# Patient Record
Sex: FEMALE | Race: WHITE | NOT HISPANIC OR LATINO | Employment: FULL TIME | URBAN - METROPOLITAN AREA
[De-identification: names, ages, dates, MRNs, and addresses within clinical notes are randomized per-mention and may not be internally consistent; named-entity substitution may affect disease eponyms.]

---

## 2022-01-23 ENCOUNTER — HOSPITAL ENCOUNTER (OUTPATIENT)
Dept: MRI IMAGING | Facility: HOSPITAL | Age: 32
Discharge: HOME | End: 2022-01-23
Payer: COMMERCIAL

## 2022-01-23 ENCOUNTER — HOSPITAL ENCOUNTER (EMERGENCY)
Facility: HOSPITAL | Age: 32
Discharge: HOME | End: 2022-01-23
Attending: EMERGENCY MEDICINE
Payer: COMMERCIAL

## 2022-01-23 VITALS
WEIGHT: 171.08 LBS | DIASTOLIC BLOOD PRESSURE: 83 MMHG | RESPIRATION RATE: 18 BRPM | BODY MASS INDEX: 30.31 KG/M2 | HEIGHT: 63 IN | SYSTOLIC BLOOD PRESSURE: 142 MMHG | OXYGEN SATURATION: 98 % | HEART RATE: 71 BPM

## 2022-01-23 DIAGNOSIS — R51.9 NEW ONSET OF HEADACHES: ICD-10-CM

## 2022-01-23 DIAGNOSIS — T50.8X5A ALLERGIC REACTION TO CONTRAST MATERIAL, INITIAL ENCOUNTER: Primary | ICD-10-CM

## 2022-01-23 DIAGNOSIS — R20.0 NUMBNESS ON LEFT SIDE: ICD-10-CM

## 2022-01-23 DIAGNOSIS — R41.3 MEMORY LOSS: ICD-10-CM

## 2022-01-23 PROCEDURE — G1004 CDSM NDSC: HCPCS

## 2022-01-23 PROCEDURE — 99284 EMERGENCY DEPT VISIT MOD MDM: CPT | Performed by: EMERGENCY MEDICINE

## 2022-01-23 PROCEDURE — 99283 EMERGENCY DEPT VISIT LOW MDM: CPT

## 2022-01-23 PROCEDURE — 70553 MRI BRAIN STEM W/O & W/DYE: CPT

## 2022-01-23 PROCEDURE — A9585 GADOBUTROL INJECTION: HCPCS | Performed by: RADIOLOGY

## 2022-01-23 RX ORDER — DIPHENHYDRAMINE HCL 25 MG
50 TABLET ORAL ONCE
Status: COMPLETED | OUTPATIENT
Start: 2022-01-23 | End: 2022-01-23

## 2022-01-23 RX ORDER — PREDNISONE 20 MG/1
20 TABLET ORAL 2 TIMES DAILY
Qty: 6 TABLET | Refills: 0 | Status: SHIPPED | OUTPATIENT
Start: 2022-01-23 | End: 2022-01-26

## 2022-01-23 RX ADMIN — GADOBUTROL 7 ML: 604.72 INJECTION INTRAVENOUS at 16:46

## 2022-01-23 RX ADMIN — PREDNISONE 50 MG: 20 TABLET ORAL at 17:56

## 2022-01-23 RX ADMIN — DIPHENHYDRAMINE HCL 50 MG: 25 TABLET, COATED ORAL at 17:57

## 2022-01-23 NOTE — DISCHARGE INSTRUCTIONS
Allergic Reaction   WHAT YOU NEED TO KNOW:   An allergic reaction is your body's response to an allergen  Allergens include medicines, food, insect stings, animal dander, mold, latex, chemicals, and dust mites  Pollen from trees, grass, and weeds can also cause an allergic reaction  DISCHARGE INSTRUCTIONS:   Return to the emergency department if:   You have a skin rash, hives, swelling, or itching that gets worse  You have trouble breathing, shortness of breath, wheezing, or coughing  Your throat tightens, or your lips or tongue swell  You have trouble swallowing or speaking  You have dizziness, lightheadedness, fainting, or confusion  You have nausea, vomiting, diarrhea, or abdominal cramps  You have chest pain or tightness  Anaphylaxis is a life-threatening allergic reaction that must be treated immediately  Your risk for anaphylaxis increases if you have asthma that is severe or not controlled  Medical conditions such as heart disease can also increase your risk  It is important to be prepared if you are at risk for anaphylaxis  Your symptoms can be worse each time you are exposed to the trigger  DISCHARGE INSTRUCTIONS:   Steps to take for signs or symptoms of anaphylaxis:   Immediately  give 1 shot of epinephrine only into the outer thigh muscle  Leave the shot in place  as directed  Your healthcare provider may recommend you leave it in place for up to 10 seconds before you remove it  This helps make sure all of the epinephrine is delivered  Call 911 and go to the emergency department,  even if the shot improved symptoms  Do not drive yourself  Bring the used epinephrine shot with you  Medicines:   Epinephrine  is used to treat severe allergic reactions such as anaphylaxis  Medicines  such as antihistamines, steroids, and bronchodilators decrease inflammation, open airways, and make breathing easier      Safety precautions:   Keep 2 shots of epinephrine with you at all times  You may need a second shot, because epinephrine only works for about 20 minutes and symptoms may return  Your healthcare provider can show you and family members how to give the shot  Check the expiration date every month and replace it before it expires  Create an action plan  Your healthcare provider can help you create a written plan that explains the allergy and an emergency plan to treat a reaction  The plan explains when to give a second epinephrine shot if symptoms return or do not improve after the first  Give copies of the action plan and emergency instructions to family members, work and school staff, and  providers  Show them how to give a shot of epinephrine  Be careful when you exercise  If you have had exercise-induced anaphylaxis, do not exercise right after you eat  Stop exercising right away if you start to develop any signs or symptoms of anaphylaxis  You may first feel tired, warm, or have itchy skin  Hives, swelling, and severe breathing problems may develop if you continue to exercise  Identify and avoid known triggers  Read food labels for ingredients  Look for triggers in your environment  Ask about treatments to prevent anaphylaxis

## 2022-01-23 NOTE — Clinical Note
Jalil Ozuna was seen and treated in our emergency department on 1/23/2022  Diagnosis: Allergic Reaction    [place]    She may return on this date:     Please excuse from work January 24 2022 for recovery from unexpected allergic reaction that required treatment     If you have any questions or concerns, please don't hesitate to call        Lopez Bustamante MD    ______________________________           _______________          _______________  Hospital Representative                              Date                                Time

## 2022-01-23 NOTE — ED PROVIDER NOTES
History  Chief Complaint   Patient presents with    Allergic Reaction     Patient brought to ED as medical emergency from MRI  Devleoped  itching and rash to chest immediately following MRI with contrast  Patient reports my throat feels funny  Denies difficulty swallowing, breathing  35 yo biological female brought from outpatient MRI for medical emergency  Patient completed MRI with contrast, and began to feel itchy and developed a rash on patient's chest   Patient affirmed a sensation of "funny feeling" in throat, but not as severe as what patient has experienced with bee sting  Denies dyspnea or chest tightness  No medication was given PTA  History provided by:  Patient      None       History reviewed  No pertinent past medical history  Past Surgical History:   Procedure Laterality Date    TONSILLECTOMY         History reviewed  No pertinent family history  I have reviewed and agree with the history as documented  E-Cigarette/Vaping     E-Cigarette/Vaping Substances     Social History     Tobacco Use    Smoking status: Never Smoker    Smokeless tobacco: Current User   Substance Use Topics    Alcohol use: Not Currently    Drug use: Yes     Types: Marijuana       Review of Systems   Constitutional: Negative for appetite change, chills and fever  HENT: Negative for sore throat  Respiratory: Negative for cough, shortness of breath and wheezing  Cardiovascular: Negative for chest pain and palpitations  Gastrointestinal: Negative for abdominal pain, diarrhea, nausea and vomiting  Genitourinary: Negative for dysuria and hematuria  Musculoskeletal: Negative for neck pain  Skin: Positive for rash  Neurological: Negative for dizziness, weakness and headaches  Psychiatric/Behavioral: Negative for suicidal ideas  All other systems reviewed and are negative  Physical Exam  Physical Exam  Vitals and nursing note reviewed     Constitutional:       General: She is not in acute distress  Appearance: She is well-developed  She is not diaphoretic  HENT:      Head: Normocephalic and atraumatic  Right Ear: External ear normal       Left Ear: External ear normal       Nose: Nose normal    Eyes:      Conjunctiva/sclera: Conjunctivae normal       Pupils: Pupils are equal, round, and reactive to light  Cardiovascular:      Rate and Rhythm: Normal rate and regular rhythm  Pulmonary:      Effort: Pulmonary effort is normal    Abdominal:      Palpations: Abdomen is soft  Musculoskeletal:         General: Normal range of motion  Cervical back: Normal range of motion and neck supple  Skin:     General: Skin is warm and dry  Capillary Refill: Capillary refill takes less than 2 seconds  Findings: Rash (lacy appearance on chest and neck) present  Neurological:      Mental Status: She is alert and oriented to person, place, and time  Gait: Gait normal    Psychiatric:         Behavior: Behavior normal          Thought Content: Thought content normal          Judgment: Judgment normal          Vital Signs  ED Triage Vitals [01/23/22 1720]   Temp Pulse Respirations Blood Pressure SpO2   -- 71 18 142/83 98 %      Temp Source Heart Rate Source Patient Position - Orthostatic VS BP Location FiO2 (%)   Oral Monitor Sitting Left arm --      Pain Score       No Pain           Vitals:    01/23/22 1720   BP: 142/83   Pulse: 71   Patient Position - Orthostatic VS: Sitting         Visual Acuity      ED Medications  Medications   diphenhydrAMINE (BENADRYL) tablet 50 mg (50 mg Oral Given 1/23/22 1757)   predniSONE tablet 50 mg (50 mg Oral Given 1/23/22 1756)       Diagnostic Studies  Results Reviewed     None                 No orders to display              Procedures  Procedures         ED Course  ED Course as of 01/23/22 1815   Sun Jan 23, 2022   1758 She is feeling better, has been given medication    I was recommending a period of observation in the ED, but she can be discharged with return precautions  SBIRT 20yo+      Most Recent Value   SBIRT (24 yo +)    In order to provide better care to our patients, we are screening all of our patients for alcohol and drug use  Would it be okay to ask you these screening questions? No Filed at: 01/23/2022 1800                    MDM    Disposition  Final diagnoses: Allergic reaction to contrast material, initial encounter - MRI contrast     Time reflects when diagnosis was documented in both MDM as applicable and the Disposition within this note     Time User Action Codes Description Comment    1/23/2022  6:00 PM Juan M Melgoza Add [T50 8X5A] Allergic reaction to contrast material, initial encounter     1/23/2022  6:00 PM Juan M Melgoza Modify [T50 8X5A] Allergic reaction to contrast material, initial encounter MRI contrast      ED Disposition     ED Disposition Condition Date/Time Comment    Discharge Good Sun Jan 23, 2022  6:00 PM [address] discharge to home/self care  Follow-up Information     Follow up With Specialties Details Why Contact Info    Marlen Helm [facility] Medicine Go to  As needed [facility] [address] [phone]            Discharge Medication List as of 1/23/2022  6:01 PM      START taking these medications    Details   predniSONE 20 mg tablet Take 1 tablet (20 mg total) by mouth 2 (two) times a day for 3 days, Starting Sun 1/23/2022, Until Wed 1/26/2022, Normal             No discharge procedures on file      PDMP Review     None          ED Provider  Electronically Signed by           Henry Delaney MD  01/23/22 1818

## 2022-01-23 NOTE — Clinical Note
Cathie Asif was seen and treated in our emergency department on 1/23/2022  Diagnosis: Allergic Reaction    [place]    She may return on this date:     Please excuse from work tonight for recovery from unexpected allergic reaction that required treatment     If you have any questions or concerns, please don't hesitate to call        Jason Dean MD    ______________________________           _______________          _______________  Hospital Representative                              Date                                Time

## 2022-01-23 NOTE — Clinical Note
Vaibhav Nava was seen and treated in our emergency department on 1/23/2022  Diagnosis:     [place]    She may return on this date: If you have any questions or concerns, please don't hesitate to call        Rossy Nunes MD    ______________________________           _______________          _______________  Hospital Representative                              Date                                Time

## 2022-05-23 ENCOUNTER — APPOINTMENT (EMERGENCY)
Dept: CT IMAGING | Facility: HOSPITAL | Age: 32
End: 2022-05-23
Payer: COMMERCIAL

## 2022-05-23 ENCOUNTER — HOSPITAL ENCOUNTER (EMERGENCY)
Facility: HOSPITAL | Age: 32
End: 2022-05-26
Attending: EMERGENCY MEDICINE | Admitting: EMERGENCY MEDICINE
Payer: COMMERCIAL

## 2022-05-23 DIAGNOSIS — R46.2 BIZARRE BEHAVIOR: Primary | ICD-10-CM

## 2022-05-23 DIAGNOSIS — F84.0 AUTISM: ICD-10-CM

## 2022-05-23 LAB
AMPHETAMINES SERPL QL SCN: NEGATIVE
BARBITURATES UR QL: NEGATIVE
BENZODIAZ UR QL: NEGATIVE
COCAINE UR QL: NEGATIVE
ETHANOL EXG-MCNC: 0 MG/DL
EXT PREG TEST URINE: NEGATIVE
EXT. CONTROL ED NAV: NORMAL
FLUAV RNA RESP QL NAA+PROBE: NEGATIVE
FLUBV RNA RESP QL NAA+PROBE: NEGATIVE
METHADONE UR QL: NEGATIVE
OPIATES UR QL SCN: NEGATIVE
OXYCODONE+OXYMORPHONE UR QL SCN: NEGATIVE
PCP UR QL: NEGATIVE
RSV RNA RESP QL NAA+PROBE: NEGATIVE
SARS-COV-2 RNA RESP QL NAA+PROBE: NEGATIVE
THC UR QL: POSITIVE

## 2022-05-23 PROCEDURE — 70450 CT HEAD/BRAIN W/O DYE: CPT

## 2022-05-23 PROCEDURE — 99285 EMERGENCY DEPT VISIT HI MDM: CPT

## 2022-05-23 PROCEDURE — 80307 DRUG TEST PRSMV CHEM ANLYZR: CPT | Performed by: EMERGENCY MEDICINE

## 2022-05-23 PROCEDURE — 81025 URINE PREGNANCY TEST: CPT | Performed by: EMERGENCY MEDICINE

## 2022-05-23 PROCEDURE — 0241U HB NFCT DS VIR RESP RNA 4 TRGT: CPT | Performed by: EMERGENCY MEDICINE

## 2022-05-23 PROCEDURE — 82075 ASSAY OF BREATH ETHANOL: CPT | Performed by: EMERGENCY MEDICINE

## 2022-05-23 PROCEDURE — G1004 CDSM NDSC: HCPCS

## 2022-05-23 PROCEDURE — 99284 EMERGENCY DEPT VISIT MOD MDM: CPT | Performed by: EMERGENCY MEDICINE

## 2022-05-23 RX ORDER — IBUPROFEN 600 MG/1
600 TABLET ORAL ONCE
Status: COMPLETED | OUTPATIENT
Start: 2022-05-23 | End: 2022-05-23

## 2022-05-23 RX ORDER — ESCITALOPRAM OXALATE 10 MG/1
10 TABLET ORAL
COMMUNITY
Start: 2022-03-31 | End: 2022-06-06

## 2022-05-23 RX ORDER — DEXTROAMPHETAMINE SACCHARATE, AMPHETAMINE ASPARTATE, DEXTROAMPHETAMINE SULFATE AND AMPHETAMINE SULFATE 5; 5; 5; 5 MG/1; MG/1; MG/1; MG/1
20 TABLET ORAL
COMMUNITY
Start: 2022-03-16 | End: 2022-06-06

## 2022-05-23 RX ORDER — DEXTROAMPHETAMINE SACCHARATE, AMPHETAMINE ASPARTATE, DEXTROAMPHETAMINE SULFATE AND AMPHETAMINE SULFATE 1.25; 1.25; 1.25; 1.25 MG/1; MG/1; MG/1; MG/1
5 TABLET ORAL
COMMUNITY
Start: 2022-03-31 | End: 2022-06-06

## 2022-05-23 RX ORDER — LORAZEPAM 1 MG/1
2 TABLET ORAL ONCE
Status: COMPLETED | OUTPATIENT
Start: 2022-05-23 | End: 2022-05-23

## 2022-05-23 RX ADMIN — LORAZEPAM 2 MG: 1 TABLET ORAL at 23:04

## 2022-05-23 RX ADMIN — IBUPROFEN 600 MG: 600 TABLET ORAL at 19:42

## 2022-05-23 NOTE — ED NOTES
Family reports that patient was taking Welbutrin 300mg that the patient stopped under advisement of her PCP, LUIS Foley with [facility]  Mother (not paternal but has been caring for patient since patient is 15) reports that patient ceased sessions with their psychologist in January and seems to be declining since that time  Patient has hx of cutting with most recent episode around mother's day  Patient cut their upper leg to be able to feel the ends of their fingers enough to remove their contacts so pt cuts to feel  Mother reports patient would not let her leave the residence yesterday because their was a nuclear war and mother had to wear a mask to avoid exposure  Mother states they are focusing on current events like the war in [place] and projecting them to today and here        Katelin Tolentino RN  05/23/22 2868

## 2022-05-23 NOTE — LETTER
[facility]  [address] [address]  Dept: [phone]                                                                 EMTALA TRANSFER CONSENT    NAME Deepak Garland                             1990                              MRN [id number]    I have been informed of my rights regarding examination, treatment, and transfer   by Dr Osvaldo Berman MD    Benefits: Other benefits (Include comment)_______________________    Risks: Potential for delay in receiving treatment    Consent for Transfer:  I acknowledge that my medical condition has been evaluated and explained to me by the emergency department physician or other qualified medical person and/or my attending physician, who has recommended that I be transferred to the service of  Accepting Physician: Dr Jose Gardner at [address] [facility] Name, [facility] & State : [place] [address]  [place], [place]  The above potential benefits of such transfer, the potential risks associated with such transfer, and the probable risks of not being transferred have been explained to me, and I fully understand them  The doctor has explained that, in my case, the benefits of transfer outweigh the risks  I agree to be transferred  I authorize the performance of emergency medical procedures and treatments upon me in both transit and upon arrival at the receiving facility  Additionally, I authorize the release of any and all medical records to the receiving facility and request they be transported with me, if possible  I understand that the safest mode of transportation during a medical emergency is an ambulance and that the Hospital advocates the use of this mode of transport  Risks of traveling to the receiving facility by car, including absence of medical control, life sustaining equipment, such as oxygen, and medical personnel has been explained to me and I fully understand them      ([place] BELOW)  [X]  I consent to the stated transfer and to be transported by ambulance/helicopter  [  ]  I consent to the stated transfer, but refuse transportation by ambulance and accept full responsibility for my transportation by car  I understand the risks of non-ambulance transfers and I exonerate the Hospital and its staff from any deterioration in my condition that results from this refusal     X___________________________________________    DATE  22  TIME________  Signature of patient or legally responsible individual signing on patient behalf           RELATIONSHIP TO PATIENT_________________________                          Provider Certification    NAME Kevin Garland                             1990                              MRN [id number]    A medical screening exam was performed on the above named patient  Based on the examination:    Condition Necessitating Transfer The primary encounter diagnosis was Bizarre behavior  A diagnosis of Autism was also pertinent to this visit      Patient Condition: The patient has been stabilized such that within reasonable medical probability, no material deterioration of the patient condition or the condition of the unborn child(darrick) is likely to result from the transfer    Reason for Transfer: Level of Care needed not available at this facility    Transfer Requirements: [address] [place], [place]   · Space available and qualified personnel available for treatment as acknowledged by REYES Gayle  · Agreed to accept transfer and to provide appropriate medical treatment as acknowledged by       Dr Ekta Mckeon  · Appropriate medical records of the examination and treatment of the patient are provided at the time of transfer   [address] [facility] __JG_____  · Transfer will be performed by qualified personnel from _______________________  and appropriate transfer equipment as required, including the use of necessary and appropriate life support measures  Provider Certification: I have examined the patient and explained the following risks and benefits of being transferred/refusing transfer to the patient/family:  The patient is stable for psychiatric transfer because they are medically stable, and is protected from harming him/herself or others during transport      Based on these reasonable risks and benefits to the patient and/or the unborn child(darrick), and based upon the information available at the time of the patients examination, I certify that the medical benefits reasonably to be expected from the provision of appropriate medical treatments at another medical facility outweigh the increasing risks, if any, to the individuals medical condition, and in the case of labor to the unborn child, from effecting the transfer      X____________________________________________ DATE 05/26/22        TIME_______      ORIGINAL - SEND TO MEDICAL RECORDS   COPY - SEND WITH PATIENT DURING TRANSFER

## 2022-05-23 NOTE — ED PROVIDER NOTES
History  Chief Complaint   Patient presents with    Psychiatric Evaluation     Pt arrives with friend who is able to answer triage questions, per friend patient is acting abnormal with delusions, periods of catatonia, marching steps  Pt refusing to answer during these periods      82-year-old female brought in by family friend for evaluation of bizarre behavior  Patient has normal autism is been becoming more and more bizarre and unable to care for self  Emotionally labile  Evidence of self-mutilation  History provided by:  Friend  History limited by:  Psychiatric disorder  Psychiatric Evaluation      None       Past Medical History:   Diagnosis Date    Autism spectrum        Past Surgical History:   Procedure Laterality Date    TONSILLECTOMY         History reviewed  No pertinent family history  I have reviewed and agree with the history as documented  E-Cigarette/Vaping     E-Cigarette/Vaping Substances     Social History     Tobacco Use    Smoking status: Never Smoker    Smokeless tobacco: Current User   Substance Use Topics    Alcohol use: Not Currently    Drug use: Yes     Types: Marijuana       Review of Systems   Unable to perform ROS: Psychiatric disorder       Physical Exam  Physical Exam  Constitutional:       General: She is not in acute distress  Appearance: She is well-developed  She is not toxic-appearing  HENT:      Head: Normocephalic and atraumatic  Right Ear: External ear normal       Left Ear: External ear normal    Eyes:      Extraocular Movements: Extraocular movements intact  Neck:      Vascular: No JVD  Trachea: No tracheal deviation  Cardiovascular:      Rate and Rhythm: Normal rate and regular rhythm  Pulmonary:      Effort: Pulmonary effort is normal  No tachypnea, accessory muscle usage or respiratory distress  Abdominal:      General: There is no distension  Musculoskeletal:         General: No deformity        Cervical back: Normal range of motion  No rigidity  Right lower leg: Normal  No edema  Left lower leg: Normal  No edema  Skin:     General: Skin is warm  Capillary Refill: Capillary refill takes less than 2 seconds  Neurological:      Mental Status: She is alert  Mental status is at baseline  Comments: Moves all 4 extremities, amublating     Psychiatric:         Mood and Affect: Affect is labile  Speech: She is noncommunicative  Behavior: Behavior is uncooperative and withdrawn  Vital Signs  ED Triage Vitals   Temperature Pulse Respirations Blood Pressure SpO2   05/23/22 1707 05/23/22 1706 05/23/22 1706 05/23/22 1706 05/23/22 1706   98 9 °F (37 2 °C) (!) 113 20 (!) 176/113 99 %      Temp Source Heart Rate Source Patient Position - Orthostatic VS BP Location FiO2 (%)   05/23/22 1707 05/23/22 1706 05/23/22 1706 05/23/22 1706 --   Oral Monitor Sitting Right arm       Pain Score       --                  Vitals:    05/23/22 1706   BP: (!) 176/113   Pulse: (!) 113   Patient Position - Orthostatic VS: Sitting         Visual Acuity      ED Medications  Medications - No data to display    Diagnostic Studies  Results Reviewed     Procedure Component Value Units Date/Time    Rapid drug screen, urine [[id number]] Collected: 05/23/22 1803    Lab Status: In process Specimen: Urine, Other Updated: 05/23/22 1821    COVID/FLU/RSV - 2 hour TAT [[id number]] Collected: 05/23/22 1807    Lab Status:  In process Specimen: Nares from Nasopharyngeal Swab Updated: 05/23/22 1810    POCT pregnancy, urine [[id number]]  (Normal) Resulted: 05/23/22 1807    Lab Status: Final result Updated: 05/23/22 1807     EXT PREG TEST UR (Ref: Negative) negative     Control valid    POCT alcohol breath test [[id number]]  (Normal) Resulted: 05/23/22 1807    Lab Status: Final result Updated: 05/23/22 1807     EXTBreath Alcohol 0 00                 No orders to display              Procedures  Procedures         ED Course MDM  Number of Diagnoses or Management Options  Diagnosis management comments: Bizarre behavior-will consult crisis      Disposition  Final diagnoses:   Bizarre behavior   Autism     Time reflects when diagnosis was documented in both MDM as applicable and the Disposition within this note     Time User Action Codes Description Comment    5/23/2022  6:22 PM Cindy Gómez Add [R46 2] Bizarre behavior     5/23/2022  6:22 PM Cindy Gómez Add [F84 0] Autism       ED Disposition     ED Disposition   Transfer to [address] [place] [address]   --    Date/Time   Mon May 23, 2022  6:22 PM    Comment   Edy Hays Meder should be transferred out to  and has been medically cleared  Follow-up Information    None         Patient's Medications    No medications on file       No discharge procedures on file      PDMP Review     None          ED Provider  Electronically Signed by           Robby Melendez MD  05/23/22 7420

## 2022-05-23 NOTE — ED NOTES
Pt observed in room with visitors at bedside  Pt can be noted to be stomping loudly on ground  Pt also noted to be sitting on visitors lap and cuddling like a small child  Pt also dancing around room and throwing themselves upon the stretcher  Will continue to monitor        Truddie Rides  83/54/65 [id number]

## 2022-05-23 NOTE — ED NOTES
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  It was during this employment that the patient was also diagnosed with autism  Along with the autism diagnosis the patient is extremely photosensitive and has sound sensitivities as well  The patient had a fainting spell at the school prior to ending her employment there  The patient has a history of self harm via cutting on her arms and upper things, and last self harmed Mother's Day weekend  The patient has been perseverating on a specific date and time, during which they feel something bad is going to happen to everyone  At this time the patient believes themselves to be an brandi that came to earth to save humanity, but had her wings cut off and is being killed by the humans they came to save  Patient has been increasingly paranoid, feeling as if there is going to be a nuclear holocaust, prompting them to refuse to let their mother leave the house  Patient has reportedly eloped from the home and run into the woods during one bout of extreme paranoia  According to the mother the patient has not been sleeping or eating well, and has lost much more than 20 pounds over the past few months  Patient is extremely tangential, and became agitated when asked if they would sign themselves in for treatment  Patient had been to [address] when they were younger   When Stephanie came up in conversation the patient began to scream that they would not return there and their roommate, who was also an brandi,  while receiving treatment there  Patient's mother is willing to petition a 36 at this time      Pablo Mortensen  Crisis Worker, [place]  22

## 2022-05-23 NOTE — ED NOTES
Assumed pt care at this time  Pt alert and awake, unlabored breathing  Not answering questions  2 visitors at bedside        Arin Hernandez RN  05/23/22 7040

## 2022-05-23 NOTE — LETTER
Section I - General Information    Name of Patient: Anjana Garland                 : 1990    Medicare #:____________________  Transport Date: 22 (PCS is valid for round trips on this date and for all repetitive trips in the 60-day range as noted below )  Origin: [place] [address]                                                         Destination:________________________________________________  Is the pt's stay covered under Medicare Part A (PPS/DRG)     (_) YES  (_) NO  Closest appropriate facility?  (_) YES  (_) NO  If no, why is transport to more distant facility required?________________________  If hosp-hosp transfer, describe services needed at 2nd facility not available at 1st facility? _________________________________  If hospice pt, is this transport related to pt's terminal illness? (_) YES (_) NO Describe____________________________________    Section II - Medical Necessity Questionnaire  Ambulance transportation is medically necessary only if other means of transport are contraindicated or would be potentially harmful to the patient  To meet this requirement, the patient must either be "bed confined" or suffer from a condition such that transport by means other than ambulance is contraindicated by the patient's condition   The following questions must be answered by the medical professional signing below for this form to be valid:    1)  Describe the MEDICAL CONDITION (physical and/or mental) of this patient AT [address] [place] [address] that requires the patient to be transported in an ambulance and why transport by other means is contraindicated by the patient's condition:__________________________________________________________________________________________________    2) Is the patient "bed confined" as defined below?     (_) YES  (_) NO  To be "be confined" the patient must satisfy all three of the following conditions: (1) unable to get up from bed without Assistance; AND (2) unable to ambulate; AND (3) unable to sit in a chair or wheelchair  3) Can this patient safely be transported by car or wheelchair Emerald F[facility] (i e , seated during transport without a medical attendant or monitoring)?   (_) YES  (_) NO    4) In addition to completing questions 1-3 above, please check any of the following conditions that apply*:  *Note: supporting documentation for any boxes checked must be maintained in the patient's medical records  (_)Contractures   (_)Non-Healed Fractures  (_)Patient is confused (_)Patient is comatose (_)Moderate/severe pain on movement (_)Danger to self/others  (_)IV meds/fluids required (_)Patient is combative(_)Need or possible need for restraints (_)DVT requires elevation of lower extremity  (_)Medical attendant required (_)Requires oxygen-unable to self administer (_)Special handling/isolation/infection control precautions required (_)Unable to tolerate seated position for time needed to transport (_)Hemodynamic monitoring required en route (_)Unable to sit in a chair or wheelchair due to decubitus ulcers or other wounds (_)Cardiac monitoring required en route (_)Morbid obesity requires additional personnel/equipment to safely handle patient (_)Orthopedic device (backboard, halo, pins, traction, brace, wedge, etc,) requiring special handling during transport (_)Other(specify)_______________________________________________    Section III - Signature of Physician or Healthcare Professional    I certify that the above information is accurate based on my evaluation of this patient, and that the medical necessity provisions of 42  40(e)(1) are met, requiring that this patient be transported by ambulance  I understand this information will be used by the Centers for Medicare and Medicaid Services (CMS) to support the determination of medical necessity for ambulance services   I represent that I am the beneficiarys attending physician; or an employee of the beneficiarys attending physician, or the hospital or facility where the beneficiary is being treated and from which the beneficiary is being transported; that I have personal knowledge of the beneficiarys condition at the time of transport; and that I meet all Medicare regulations and applicable State licensure laws for the credential indicated  (_) If this box is checked, I also certify that the patient is physically or mentally incapable of signing the ambulance service's claim and that the institution with which I am affiliated has furnished care, services, or assistance to the patient  My signature below is made on behalf of the patient pursuant to 42 CFR §424 36(b)(4)  In accordance with 42 CFR §424 37, the specific reason(s) that the patient is physically or mentally incapable of signing the claim form is as follows: _________________________________________________________________________________________________________      Signature of Physician* or Healthcare Professional______________________________________________________________  Signature Date 05/26/22 (For scheduled repetitive transports, this form is not valid for transports performed more than 60 days after this date)    Printed Name & Credentials of Physician or Healthcare Professional (MD, DO, RN, etc )________________________________  *Form must be signed by patient's attending physician for scheduled, repetitive transports   For non-repetitive, unscheduled ambulance transports, if unable to obtain the signature of the attending physician, any of the following may sign (choose appropriate option below)  (_) Physician Assistant   (_)  Clinical Nurse Specialist    (_)  Licensed Practical Nurse    (_)    (_)  Nurse Practitioner     (_)  Registered Nurse                (_)                         (_) Discharge Planner

## 2022-05-23 NOTE — ED NOTES
Patient is light sensitive, complains if any overhead lighting is too bright  At this time, cowered in the corner of there room, mewling  Care givers at bedside        Catherine Contreras RN  05/23/22 7352

## 2022-05-24 PROCEDURE — 99203 OFFICE O/P NEW LOW 30 MIN: CPT | Performed by: PSYCHIATRY & NEUROLOGY

## 2022-05-24 RX ORDER — OLANZAPINE 5 MG/1
5 TABLET, ORALLY DISINTEGRATING ORAL DAILY
Status: DISCONTINUED | OUTPATIENT
Start: 2022-05-25 | End: 2022-05-26 | Stop reason: HOSPADM

## 2022-05-24 RX ORDER — OLANZAPINE 10 MG/1
5 INJECTION, POWDER, LYOPHILIZED, FOR SOLUTION INTRAMUSCULAR EVERY 6 HOURS PRN
Status: DISCONTINUED | OUTPATIENT
Start: 2022-05-24 | End: 2022-05-26 | Stop reason: HOSPADM

## 2022-05-24 NOTE — ED NOTES
Pt wandering hallway after using bathroom, RN reminded pt she must return to her room due to the privacy of other pts  Pt provided with more menthol cough drops        Cheri Ellis RN  05/24/22 1943

## 2022-05-24 NOTE — ED NOTES
Meal tray ordered at this time  Pt is asking RN for pencil to write her thoughts, RN explained to pt that she is to use the markers provided as she is unable to have a pencil due to safety precautions  Pt states that she is unable to write how she needs to with the provided markers  RN pointed out at this time that pt has many items in her room at this moment that are not allowable due to safety purposes and that giving a pencil at this time is not an option        Sulaiman Arriaga RN  05/24/22 8750

## 2022-05-24 NOTE — ED NOTES
Patient continues to rest on stretcher, respirations even and unlabored   Will defer VS and reassesment     Destin North RN  05/24/22 2329

## 2022-05-24 NOTE — ED NOTES
Pt came out of bathroom with concerns of air quality stating, 'I'm sick of breathing in the toxins, it's making me lose it, I need mint and water'  RN assured pt that HEPA filters are present in the hospitals filtration system  Pt responded, 'I can see you don't get it and won't do anything to help me'  RN provided pt with menthol cough drops and ice water at this time in which they were grateful for  RN informed pt she is doing the best she can to accommodate pt but they communicate with RN about what they need at this time        Lima Zaldivar RN  05/24/22 2972

## 2022-05-24 NOTE — ED NOTES
Precert:    [facility] has no availability in beds today    Fax referral to Monisha Ricardo)    [facility]  Patient is refusing     [facility] - no acute beds - will call if changes   [facility] - no beds  [facility]) no beds  [facility]) no beds  [facility] - [facility] beds  [address] (Cathie Wiley) no beds  [facility]) no beds  [facility]) no beds  [facility] - no ans

## 2022-05-24 NOTE — ED NOTES
Insurance   EVS (Eligibility Verification System) called - [phone]    Automated system indicates: insurance is active with [employer] ID # [id number]  (under the name [facility])

## 2022-05-24 NOTE — ED NOTES
Offered patient breakfast at this time  Pt denies wanting any at this time  Pt stating "it is terrible to be locked up in here when I need to be out in nature   Requesting to go outside to use their vape " Pt told they cannot do so while at the hospital      Gianna Hernandez, JERO  05/24/22 4800

## 2022-05-24 NOTE — ED NOTES
Pt walked to nurses station, crying  This RN asked what we could do for them and they requested a pad and new pair of scrubs  RN offered shower at this time and pt declined  Pt still tearful, using bathroom at this time        Miah Madrid, JERO  05/24/22 0577

## 2022-05-24 NOTE — ED NOTES
RN assumed care of pt at this time  Pt is in bed with blanket and belongings from home; per previous RN, charge nurse and security are aware of pt in possession of belongings as they help 'drown out the voices'  No distress noted at this time; equal and even chest rises noted        Destiny Rivera RN  05/24/22 7807

## 2022-05-24 NOTE — ED NOTES
Pt's mother and brother about to leave pt's bedside when pt starts getting upset, banging head against wall  RN and crisis worker at bedside  Pt laying on floor staring at ceiling  Provider called to bedside  Pt staring at ceiling, not answering provider's questions, but looking around at RN and provider  Pt sits up, alert, and gets into stretcher  Vitals taken at bedside  Pt starts rambling "This is not how you treat me, I don't have a mental health problem  Everyone has a mental health problem, but they aren't listening to me " Pt continues to ramble about saving the world, people living under the ocean with nobody knowing, stating "stop using plastic"  Pt tearful  RN tries to redirect pt and calm pt  Pt continues to ramble  Pt starts to calm, pulling blanket over head and crying  RN continues to talk to pt in calm manner  Pt responding in calm manner, but rambling       Taz Lau RN  05/23/22 4489 Private car

## 2022-05-24 NOTE — ED NOTES
Clinical sent to [facility] for review       Allison Downing, CORNLEIO  05/24/22  0262

## 2022-05-24 NOTE — ED NOTES
Assumed care of patient at this time  Patient pacing around room, stomping feet, continuing to scream "Aditya Wiergate"  Patient appears to be responding to internal stimuli  Provider aware  Orders placed  Patient took medication without incident, given new pair of [facility] scrubs       Gera Lyn RN  05/23/22 9473

## 2022-05-24 NOTE — ED NOTES
Asked patient again if they would like anything to eat and pt replies "why do you keep asking me things that I don't have an answer to  I don't want to talk to you because you can't give me what I want " When asked what she wants pt became tearful and stated "to not be an brandi locked in this cell " Asked pt if this RN could call mother which she agreed to at this time  Voicemail left for mother at this time        Blayne Cunningham RN  05/24/22 0055

## 2022-05-24 NOTE — ED NOTES
Pt lying in bed in more calm state, still tearful  Mother and brother left bedside       Arin Hernandez RN  05/23/22 5492

## 2022-05-24 NOTE — ED NOTES
Patient does NOT want [facility] to be considered during a bed search      Christine Barragan  Crisis Worker, [place]  05/23/22

## 2022-05-24 NOTE — ED NOTES
RN is on hold with [facility] at this time to request psych consult for pt        Merry Boast, JERO  05/24/22 0213

## 2022-05-24 NOTE — ED NOTES
Assumed pt care at this time  Pt sleeping at this time  4 checks per hour being completed on paper charting        Ciarra Levin RN  05/24/22 7105

## 2022-05-24 NOTE — ED NOTES
Spoke with Nurse and patient's mother  Patient provided with own art supplies and paper to create while here in the Emergency Department       Jannette Cheung  Crisis Worker, [place]  05/23/22

## 2022-05-24 NOTE — ED NOTES
Call placed to [address], spoke with Ismael Davenport, who indicated that they have no beds at this time       Anastasiia Viramontes, CORNELIO  05/24/22  2777

## 2022-05-24 NOTE — ED NOTES
RN contacted Dr Adenike Saini for psych consult; RN was directed to call [facility] at this time for overnight consult         Merry Boast, RN  05/24/22 3924

## 2022-05-24 NOTE — ED NOTES
Pt moaning and scratching back, RN went to pt bedside to evaluate pt and ask them what is bothering them  Pt threw themselves onto the floor and laid there without verbalizing needs to RN  Pt not harmed and no rash noted on pt at this time         Allen Gregory RN  05/24/22 3201

## 2022-05-24 NOTE — ED NOTES
Object sticking out of iPad is a stylus, previous RNs allowed pt to have object  RN informed pt that if her safety becomes a concern the stylet will be removed from the room        Federica Byrd RN  05/24/22 8682

## 2022-05-24 NOTE — ED NOTES
Upheld 302 faxed to  Intake for review for in network bed      Pepito Benedict  Crisis Worker, [place]  05/23/22

## 2022-05-24 NOTE — ED NOTES
Upheld 302 faxed to [facility] ([facility] AT [facility]  Crisis Worker, [facility]  05/23/22

## 2022-05-24 NOTE — ED NOTES
Pt having menstruation and stating "can you call my mom and tell her that I am not suppose to be having this, especially this much " Pt asked if they were in pain at this time and they report they are "always in pain and this has been getting worse because they are not in the sunlight "     Blayne Cunningham, JERO  05/24/22 4883

## 2022-05-24 NOTE — ED NOTES
Pt wearing headphones and eyewear in room while pacing back and forth with iPad, object noted sticking up from iPad, RN will investigate object        Sheldon Knight RN  05/24/22 9019

## 2022-05-24 NOTE — ED NOTES
RN called bita to follow up with estimated time for consult, per bita, RN will be contacted with ETA when available       Mumtaz Dos Santos RN  05/24/22 6806

## 2022-05-25 PROCEDURE — 99213 OFFICE O/P EST LOW 20 MIN: CPT | Performed by: PSYCHIATRY & NEUROLOGY

## 2022-05-25 RX ORDER — ESCITALOPRAM OXALATE 10 MG/1
10 TABLET ORAL DAILY
Status: DISCONTINUED | OUTPATIENT
Start: 2022-05-25 | End: 2022-05-26 | Stop reason: HOSPADM

## 2022-05-25 RX ORDER — NICOTINE 21 MG/24HR
21 PATCH, TRANSDERMAL 24 HOURS TRANSDERMAL ONCE
Status: DISCONTINUED | OUTPATIENT
Start: 2022-05-25 | End: 2022-05-26 | Stop reason: HOSPADM

## 2022-05-25 RX ADMIN — Medication 21 MG: at 21:43

## 2022-05-25 RX ADMIN — OLANZAPINE 5 MG: 5 TABLET, ORALLY DISINTEGRATING ORAL at 09:13

## 2022-05-25 RX ADMIN — ESCITALOPRAM OXALATE 10 MG: 10 TABLET ORAL at 15:17

## 2022-05-25 NOTE — ED NOTES
Completed 302/303 Paperwork faxed to [employer] at [facility]      Ilene Gee  Crisis Worker, [place]  05/25/22

## 2022-05-25 NOTE — ED NOTES
Pt completed psych consult; now pt is fixated on wanting to see her mother and be outdoors  RN informed pt that she will move them into another room once it becomes available so she can see the outside        Destiny Rivera RN  05/24/22 2030

## 2022-05-25 NOTE — ED NOTES
Pts mother called and said she is going to come visit and bring pts contacts        Mely Day, [address] [facility]  05/25/22 5534
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
## 2022-05-25 NOTE — ED NOTES
Assumed care of patient at this time  Patient alert and cooperative at this time  Will continue to monitor        Jac Rome RN  05/24/22 1797

## 2022-05-25 NOTE — ED NOTES
Bed Search    [facility] Miles): clinical faxed  [facility]): no bed  [facility] ([facility]): Clinical faxed  [facility] Carrasco):  No beds  [facility]ñoz): No beds  [facility]): clinical faxed  [facility] Re[facility]): No bed  [facility] Galloway): no beds  [facility]  [facility]): No bed

## 2022-05-25 NOTE — ED NOTES
Pt was ordered gluten free breakfast tray and consumed 100% of breakfast  Pt currently listening to music and dancing in room   Pt is cooperative at this time        Charles Marsh RN  05/25/22 1374

## 2022-05-25 NOTE — ED NOTES
Pt provided with turkey sandwich and more cough drops at this time per pt request      Fuentes Rutledge, JERO  05/24/22 1068

## 2022-05-25 NOTE — ED NOTES
Bed Search:    Esperanza Herrera- No beds available  Raul Kelsey- No appropriate beds  Biju Nelson- Will review- FAXED  Isra 81- No beds available  112 Naun- No beds available  [facility]- Will Review for D/C bed 5 25 22- FAXED  Ermelinda Tineo- Will Review for D/C bed 5 25 22- FAXED  Azeb- Pt   Still under review    Ilene Garcias  Crisis Worker, [place]  05/24/22

## 2022-05-25 NOTE — ED NOTES
Follow up on previous Bed Search:    [facility]ia Remy Farias- Does not have an appropriate bed available at this time  Josey 41 due to acuity    Zoltan Peters  Crisis Worker, [facility]  05/25/22

## 2022-05-25 NOTE — ED CARE HANDOFF
Emergency Department Sign Out Note        Sign out and transfer of care from [facility]  See Separate Emergency Department note  The patient, Jayda Garland, was evaluated by the previous provider for psychosis  Workup Completed:  Crisis, psychiatry  302  ED Course / Workup Pending (followup): Was told by the nurse that the patient forcefully struck her head into the floor  She is awake and alert with no marks on her head eating lunch in asked me for water  She is still under 302  Psychiatry will be seeing her today for 303 hearing  There is no bed placement as of yet  Patient is currently menstruating  She was given a pad  Procedures  MDM        Disposition  Final diagnoses:   Bizarre behavior   Autism     Time reflects when diagnosis was documented in both MDM as applicable and the Disposition within this note     Time User Action Codes Description Comment    5/23/2022  6:22 PM Melrose Hem Add [R46 2] Bizarre behavior     5/23/2022  6:22 PM Sara Hem Add [F84 0] Autism       ED Disposition     ED Disposition   Transfer to [address] [place] [address]   --    Date/Time   Mon May 23, 2022  6:22 PM    Comment   Jayda Garland should be transferred out to  and has been medically cleared  Follow-up Information    None       Patient's Medications   Discharge Prescriptions    No medications on file     No discharge procedures on file         ED Provider  Electronically Signed by     Ashley Sawyer MD  05/25/22 119 Ashanti Oviedo MD  05/25/22 2555

## 2022-05-25 NOTE — ED NOTES
Pt requesting to speak to her mom, this RN called moms phone number listed in chart  Phone rang twice then went to voicemail   Left message to call back      Scott Cueva RN  05/25/22 2046

## 2022-05-25 NOTE — ED NOTES
Assumed care of patient at this time  Patient calm and playing with belongings in room  Given something to eat and drink at their request  Denies pain        Julissa Stanley RN  05/25/22 4015

## 2022-05-25 NOTE — ED NOTES
Pt ambulated to the bathroom and then attempted to bang head on room door  Rn at bedside  pt then slumped over in bed and stopped answering staff  Pt is alert and eyes open  Security also at bedside  As RN was talking to pt , pt sat up and hit head on the floor  Pt told to stop banging head  Pt then laid back in bed and began to cry  Pt has no signs of trauma to her head  Pt is alert with eyes open   Pts vitals are stable  Pts things removed from room for safety  Pts blanket/glasses/ipad/headphones/rubicscube/other toys in  bag at nurses station    Provider made aware of entire episode       Iris Cotto RN  05/25/22 0557 [place], RN  05/25/22 4123

## 2022-05-25 NOTE — ED NOTES
Information for 303 hearing:    Friday May 27, 2022  8:00am  Call In: [phone]  Code: [id number]#    Beni Phillip  Crisis Worker, [facility]  05/25/22

## 2022-05-25 NOTE — ED NOTES
Pt came to RN requesting her to call mother to request she bring in new L eye contact for pt  Pt believes both contacts would provide clarity  RN left message for mother at this time         Anushka Forrester RN  05/24/22 2005

## 2022-05-25 NOTE — ED NOTES
Patient dancing and writing in room  States she is not able to sleep at this time because "how can you sleep when there is so much pain in this world?"       Ulysses Ship, RN  05/25/22 6331

## 2022-05-25 NOTE — ED NOTES
Per Chris Yuan at [facility]- Patient is denied at this time due to acuity      Tamie Zambrano  Crisis Worker, [place]  05/24/22

## 2022-05-25 NOTE — ED NOTES
Assumed care of pt at this time  Pt resting quietly, showing no signs of distress       Read [place]  05/25/22 3734

## 2022-05-25 NOTE — PROGRESS NOTES
Progress Note - [place] [address] 29 y o  female MRN: [id number]  Unit/Bed#: ED 13 Encounter: [id number]    REQUIRED DOCUMENTATION:     1  This service was provided via Telemedicine  2  Provider located at [address] [place] [address]  3  TeleMed provider: Nam Walsh MD and Seth Zavala DO  4  Identify all parties in room with patient during tele consult: patient, nurse   5  After connecting through televideo, patient was identified by name and date of birth  Parent/patient was then informed that this was being conducted confidentially over secure lines  My office door was closed  No one else was in the room  Patient acknowledged consent and understanding of privacy and security of the Telemedicine visit  I informed the patient that I have reviewed their record in Epic and presented the opportunity for them to ask any questions regarding the visit today  The patient agreed to participate  Assessment/Plan  Diagnosis:  Unspecified mood disorder, active specified psychotic disorder, rule out substance induced mood disorder versus psychotic disorder; Autism per history    Recommended Treatment:    Patient requires inpatient psychiatric hospitalization   Currently 302 status, petitioning 303 with hearing scheduled for Friday 05/27/2022   Continue Zyprexa Zydis 5 mg daily for psychotic symptoms   Resume prior to admission Lexapro 10 mg daily for mood   Continue one-to-one for safety   Case management following for placement   Discussed with primary care team    Subjective  Gisela Ramirez is a 29 y o  female to male, they/them pronouns, domiciled with mother, college educated, unemployed, with a reported past medical history of depression, anxiety, ADHD, and autism spectrum disorder presenting on a 36 by mother for bizarre behavior, self-mutilation, and inability to care for self  Patient was seen and examined for continuity of care    On evaluation patient was disorganized, tangental, restless and fidgety noted to be pacing around the room  They were noted to make loose associations, stating that they are suffering from [place] and elaborating that they feel as if they are being burned alive  They reported feeling paranoid with worsening mood swings  They also reports feeling aggressive  When asked about reason for hospitalization patient have poor insight stating my brother disrupts the house routine    Patient denied need for inpatient psychiatric treatment at this time stating I want to go home and go on disability  I want to be an     Insight and judgment remain poor  Current Medications:  Current Facility-Administered Medications   Medication Dose Route Frequency Provider Last Rate    escitalopram  10 mg Oral Daily Ayush Morris, DO      OLANZapine  5 mg Oral Daily Afua Vasques MD      OLANZapine  5 mg Intramuscular Q6H PRN Afua Vasques MD         Behavioral Health Medications: all current active meds have been reviewed and planned medication changes: Resuming prior to admission Lexapro as noted in plan above  Vital signs in last 24 hours:  Temp:  [97 8 °F (36 6 °C)] 97 8 °F (36 6 °C)  HR:  [59-82] 70  Resp:  [16-18] 17  BP: (131-154)/(72-97) 149/97    Laboratory results:    I have personally reviewed all pertinent laboratory/tests results    Most Recent Labs:   Lab Results   Component Value Date    PREGUR negative 05/23/2022       Psychiatric Review of Systems:  Behavior over the last 24 hours:  unchanged  Sleep: normal  Appetite: normal  Medication side effects: No   ROS: no complaints, all other systems are negative    Mental Status Exam:  Appearance:  alert, intermittent eye contact and appears stated age   Behavior:  Cooperative at times, requiring some redirection   Motor: restless and fidgety   Speech:  increased rate   Mood:  "not good"   Affect:  blunted   Thought Process:  disorganized, tangential, loose associations   Thought Content: paranoid ideation   Perceptual disturbances: no reported hallucinations and appears distracted   Risk Potential: No active homicidal ideation, Suicidal Ideation "Only when I think about how bleak things are"   Cognition: oriented to self and situation, appears to be of average intelligence and cognition not formally tested   Insight:  Poor   Judgment: Poor     Risks, benefits and possible side effects of Medications:   Risks, benefits, and possible side effects of medications have been explained to the patient, who verbalizes understanding          Denise Lawson DO  Psychiatry Resident, PGY-2    This note was not shared with the patient due to reasonable likelihood of causing patient harm

## 2022-05-25 NOTE — ED NOTES
Dr Edwina Parr at bedside to evaluate pt  Pt is cooperative and awake and alert at this time  Pt provided with gluten free meal tray   Pt ate 100% of meal at this time      Jacoby Harrell RN  05/25/22 3072

## 2022-05-26 ENCOUNTER — HOSPITAL ENCOUNTER (INPATIENT)
Facility: HOSPITAL | Age: 32
LOS: 11 days | Discharge: HOME | DRG: 885 | End: 2022-06-06
Attending: HOSPITALIST | Admitting: PSYCHIATRY & NEUROLOGY
Payer: COMMERCIAL

## 2022-05-26 VITALS
TEMPERATURE: 97.7 F | OXYGEN SATURATION: 100 % | RESPIRATION RATE: 16 BRPM | HEART RATE: 71 BPM | SYSTOLIC BLOOD PRESSURE: 134 MMHG | DIASTOLIC BLOOD PRESSURE: 91 MMHG

## 2022-05-26 DIAGNOSIS — F84.0 AUTISM: ICD-10-CM

## 2022-05-26 DIAGNOSIS — G47.00 INSOMNIA: ICD-10-CM

## 2022-05-26 DIAGNOSIS — F29 PSYCHOSIS (HCC): Primary | ICD-10-CM

## 2022-05-26 DIAGNOSIS — R46.2 BIZARRE BEHAVIOR: ICD-10-CM

## 2022-05-26 RX ORDER — HALOPERIDOL 5 MG/ML
2.5 INJECTION INTRAMUSCULAR
Status: CANCELLED | OUTPATIENT
Start: 2022-05-26

## 2022-05-26 RX ORDER — LORAZEPAM 1 MG/1
1 TABLET ORAL EVERY 6 HOURS PRN
Status: CANCELLED | OUTPATIENT
Start: 2022-05-26

## 2022-05-26 RX ORDER — HYDROXYZINE HYDROCHLORIDE 25 MG/1
25 TABLET, FILM COATED ORAL
Status: DISCONTINUED | OUTPATIENT
Start: 2022-05-26 | End: 2022-06-06 | Stop reason: HOSPADM

## 2022-05-26 RX ORDER — OLANZAPINE 5 MG/1
5 TABLET, ORALLY DISINTEGRATING ORAL DAILY
Status: CANCELLED | OUTPATIENT
Start: 2022-05-27

## 2022-05-26 RX ORDER — HYDROXYZINE HYDROCHLORIDE 25 MG/1
50 TABLET, FILM COATED ORAL
Status: CANCELLED | OUTPATIENT
Start: 2022-05-26

## 2022-05-26 RX ORDER — HYDROXYZINE HYDROCHLORIDE 25 MG/1
25 TABLET, FILM COATED ORAL
Status: CANCELLED | OUTPATIENT
Start: 2022-05-26

## 2022-05-26 RX ORDER — HALOPERIDOL 5 MG
5 TABLET ORAL
Status: DISCONTINUED | OUTPATIENT
Start: 2022-05-26 | End: 2022-06-06 | Stop reason: HOSPADM

## 2022-05-26 RX ORDER — BENZTROPINE MESYLATE 1 MG/ML
1 INJECTION INTRAMUSCULAR; INTRAVENOUS
Status: CANCELLED | OUTPATIENT
Start: 2022-05-26

## 2022-05-26 RX ORDER — HALOPERIDOL 1 MG/1
2 TABLET ORAL
Status: CANCELLED | OUTPATIENT
Start: 2022-05-26

## 2022-05-26 RX ORDER — LORAZEPAM 2 MG/ML
2 INJECTION INTRAMUSCULAR
Status: DISCONTINUED | OUTPATIENT
Start: 2022-05-26 | End: 2022-06-06 | Stop reason: HOSPADM

## 2022-05-26 RX ORDER — HALOPERIDOL 5 MG/ML
5 INJECTION INTRAMUSCULAR
Status: DISCONTINUED | OUTPATIENT
Start: 2022-05-26 | End: 2022-06-06 | Stop reason: HOSPADM

## 2022-05-26 RX ORDER — BENZTROPINE MESYLATE 1 MG/ML
1 INJECTION INTRAMUSCULAR; INTRAVENOUS
Status: DISCONTINUED | OUTPATIENT
Start: 2022-05-26 | End: 2022-06-06 | Stop reason: HOSPADM

## 2022-05-26 RX ORDER — HALOPERIDOL 5 MG
5 TABLET ORAL
Status: CANCELLED | OUTPATIENT
Start: 2022-05-26

## 2022-05-26 RX ORDER — TRAZODONE HYDROCHLORIDE 100 MG/1
100 TABLET ORAL
Status: CANCELLED | OUTPATIENT
Start: 2022-05-26

## 2022-05-26 RX ORDER — ACETAMINOPHEN 325 MG/1
975 TABLET ORAL EVERY 6 HOURS PRN
Status: CANCELLED | OUTPATIENT
Start: 2022-05-26

## 2022-05-26 RX ORDER — HALOPERIDOL 2 MG/1
2 TABLET ORAL
Status: DISCONTINUED | OUTPATIENT
Start: 2022-05-26 | End: 2022-06-06 | Stop reason: HOSPADM

## 2022-05-26 RX ORDER — LORAZEPAM 2 MG/ML
2 INJECTION INTRAMUSCULAR
Status: CANCELLED | OUTPATIENT
Start: 2022-05-26

## 2022-05-26 RX ORDER — HALOPERIDOL 5 MG/ML
2.5 INJECTION INTRAMUSCULAR
Status: DISCONTINUED | OUTPATIENT
Start: 2022-05-26 | End: 2022-06-06 | Stop reason: HOSPADM

## 2022-05-26 RX ORDER — LORAZEPAM 2 MG/ML
1 INJECTION INTRAMUSCULAR
Status: CANCELLED | OUTPATIENT
Start: 2022-05-26

## 2022-05-26 RX ORDER — ESCITALOPRAM OXALATE 10 MG/1
10 TABLET ORAL DAILY
Status: CANCELLED | OUTPATIENT
Start: 2022-05-27

## 2022-05-26 RX ORDER — ACETAMINOPHEN 325 MG/1
975 TABLET ORAL EVERY 6 HOURS PRN
Status: DISCONTINUED | OUTPATIENT
Start: 2022-05-26 | End: 2022-06-06 | Stop reason: HOSPADM

## 2022-05-26 RX ORDER — NICOTINE 21 MG/24HR
21 PATCH, TRANSDERMAL 24 HOURS TRANSDERMAL ONCE
Status: DISCONTINUED | OUTPATIENT
Start: 2022-05-26 | End: 2022-05-26 | Stop reason: HOSPADM

## 2022-05-26 RX ORDER — BENZTROPINE MESYLATE 1 MG/1
1 TABLET ORAL EVERY 6 HOURS PRN
Status: DISCONTINUED | OUTPATIENT
Start: 2022-05-26 | End: 2022-06-06 | Stop reason: HOSPADM

## 2022-05-26 RX ORDER — LORAZEPAM 2 MG/ML
1 INJECTION INTRAMUSCULAR EVERY 4 HOURS PRN
Status: CANCELLED | OUTPATIENT
Start: 2022-05-26

## 2022-05-26 RX ORDER — BENZTROPINE MESYLATE 1 MG/ML
0.5 INJECTION INTRAMUSCULAR; INTRAVENOUS
Status: DISCONTINUED | OUTPATIENT
Start: 2022-05-26 | End: 2022-06-06 | Stop reason: HOSPADM

## 2022-05-26 RX ORDER — POLYETHYLENE GLYCOL 3350 17 G/17G
17 POWDER, FOR SOLUTION ORAL DAILY PRN
Status: DISCONTINUED | OUTPATIENT
Start: 2022-05-26 | End: 2022-06-06 | Stop reason: HOSPADM

## 2022-05-26 RX ORDER — ACETAMINOPHEN 325 MG/1
650 TABLET ORAL EVERY 4 HOURS PRN
Status: DISCONTINUED | OUTPATIENT
Start: 2022-05-26 | End: 2022-06-06 | Stop reason: HOSPADM

## 2022-05-26 RX ORDER — NICOTINE 21 MG/24HR
1 PATCH, TRANSDERMAL 24 HOURS TRANSDERMAL DAILY
Status: DISCONTINUED | OUTPATIENT
Start: 2022-05-27 | End: 2022-05-26

## 2022-05-26 RX ORDER — LORAZEPAM 2 MG/ML
1 INJECTION INTRAMUSCULAR
Status: DISCONTINUED | OUTPATIENT
Start: 2022-05-26 | End: 2022-06-06 | Stop reason: HOSPADM

## 2022-05-26 RX ORDER — NICOTINE 21 MG/24HR
1 PATCH, TRANSDERMAL 24 HOURS TRANSDERMAL DAILY
Status: CANCELLED | OUTPATIENT
Start: 2022-05-26

## 2022-05-26 RX ORDER — MAGNESIUM HYDROXIDE/ALUMINUM HYDROXICE/SIMETHICONE 120; 1200; 1200 MG/30ML; MG/30ML; MG/30ML
30 SUSPENSION ORAL EVERY 4 HOURS PRN
Status: CANCELLED | OUTPATIENT
Start: 2022-05-26

## 2022-05-26 RX ORDER — LORAZEPAM 1 MG/1
1 TABLET ORAL EVERY 6 HOURS PRN
Status: DISCONTINUED | OUTPATIENT
Start: 2022-05-26 | End: 2022-06-06 | Stop reason: HOSPADM

## 2022-05-26 RX ORDER — BENZTROPINE MESYLATE 1 MG/1
1 TABLET ORAL EVERY 6 HOURS PRN
Status: CANCELLED | OUTPATIENT
Start: 2022-05-26

## 2022-05-26 RX ORDER — TRAZODONE HYDROCHLORIDE 50 MG/1
100 TABLET ORAL
Status: DISCONTINUED | OUTPATIENT
Start: 2022-05-26 | End: 2022-05-27

## 2022-05-26 RX ORDER — OLANZAPINE 5 MG/1
5 TABLET, ORALLY DISINTEGRATING ORAL DAILY
Status: DISCONTINUED | OUTPATIENT
Start: 2022-05-27 | End: 2022-05-28

## 2022-05-26 RX ORDER — POLYETHYLENE GLYCOL 3350 17 G/17G
17 POWDER, FOR SOLUTION ORAL DAILY PRN
Status: CANCELLED | OUTPATIENT
Start: 2022-05-26

## 2022-05-26 RX ORDER — ACETAMINOPHEN 325 MG/1
650 TABLET ORAL EVERY 4 HOURS PRN
Status: CANCELLED | OUTPATIENT
Start: 2022-05-26

## 2022-05-26 RX ORDER — MAGNESIUM HYDROXIDE/ALUMINUM HYDROXICE/SIMETHICONE 120; 1200; 1200 MG/30ML; MG/30ML; MG/30ML
30 SUSPENSION ORAL EVERY 4 HOURS PRN
Status: DISCONTINUED | OUTPATIENT
Start: 2022-05-26 | End: 2022-06-06 | Stop reason: HOSPADM

## 2022-05-26 RX ORDER — HALOPERIDOL 5 MG/ML
5 INJECTION INTRAMUSCULAR
Status: CANCELLED | OUTPATIENT
Start: 2022-05-26

## 2022-05-26 RX ORDER — ACETAMINOPHEN 325 MG/1
650 TABLET ORAL EVERY 6 HOURS PRN
Status: CANCELLED | OUTPATIENT
Start: 2022-05-26

## 2022-05-26 RX ORDER — HYDROXYZINE 50 MG/1
50 TABLET, FILM COATED ORAL
Status: DISCONTINUED | OUTPATIENT
Start: 2022-05-26 | End: 2022-06-06 | Stop reason: HOSPADM

## 2022-05-26 RX ORDER — BENZTROPINE MESYLATE 1 MG/ML
0.5 INJECTION INTRAMUSCULAR; INTRAVENOUS
Status: CANCELLED | OUTPATIENT
Start: 2022-05-26

## 2022-05-26 RX ORDER — LORAZEPAM 2 MG/ML
1 INJECTION INTRAMUSCULAR EVERY 4 HOURS PRN
Status: DISCONTINUED | OUTPATIENT
Start: 2022-05-26 | End: 2022-06-06 | Stop reason: HOSPADM

## 2022-05-26 RX ORDER — ESCITALOPRAM OXALATE 10 MG/1
10 TABLET ORAL DAILY
Status: DISCONTINUED | OUTPATIENT
Start: 2022-05-27 | End: 2022-06-06 | Stop reason: HOSPADM

## 2022-05-26 RX ORDER — ACETAMINOPHEN 325 MG/1
650 TABLET ORAL EVERY 6 HOURS PRN
Status: DISCONTINUED | OUTPATIENT
Start: 2022-05-26 | End: 2022-06-06 | Stop reason: HOSPADM

## 2022-05-26 RX ADMIN — NICOTINE POLACRILEX 2 MG: 2 GUM, CHEWING BUCCAL at 17:53

## 2022-05-26 RX ADMIN — ESCITALOPRAM OXALATE 10 MG: 10 TABLET ORAL at 08:49

## 2022-05-26 RX ADMIN — NICOTINE POLACRILEX 2 MG: 2 GUM, CHEWING BUCCAL at 12:52

## 2022-05-26 RX ADMIN — NICOTINE POLACRILEX 2 MG: 2 GUM, CHEWING BUCCAL at 20:26

## 2022-05-26 RX ADMIN — OLANZAPINE 5 MG: 5 TABLET, ORALLY DISINTEGRATING ORAL at 08:49

## 2022-05-26 RX ADMIN — NICOTINE POLACRILEX 2 MG: 2 GUM, CHEWING BUCCAL at 08:58

## 2022-05-26 RX ADMIN — NICOTINE POLACRILEX 2 MG: 2 GUM, CHEWING BUCCAL at 02:53

## 2022-05-26 RX ADMIN — Medication 21 MG: at 00:24

## 2022-05-26 NOTE — ED NOTES
Per parent- Patient struggles with some communication, and may not communicate certain needs effectively      Jonatan James  Crisis Worker, [place]  05/25/22

## 2022-05-26 NOTE — ED NOTES
Patient is accepted at [facility]  Patient is accepted by Shikha Arriaza per [address] in Intake  Transportation is arranged with Chantel at [employer]  Transportation is scheduled for **  (BLS requested/after 3pm)      Nurse report is to be called to [phone] prior to patient transfer      Akanksha Ferrera LMSW  05/26/22  5343

## 2022-05-26 NOTE — ED NOTES
Insurance Authorization for admission:   Phone call placed to [employer]  Phone number: [phone]  Option 2  Spoke to [facility]  6 days approved  Level of care: [facility] ([facility])  Review on 5/31  Authorization # [id number]  EVS (Eligibility Verification System) called - [phone]  Automated system indicates: not eligible for MA  Insurance Authorization for Transportation:    Not yet arranged  May be required for BLS       Rita Millard LMSW  05/26/22  1253

## 2022-05-26 NOTE — ED NOTES
Khbxgyz-Mnmbsf-nf appropriate bed   Kelly Roach-no appropriate bed  [place]-no answer  Evan 1 M 302  Friends-called repeatedly, was put on hold each time and no one returned to phone  Shannan-Mirella-zen Hernández-Cindy-zen RoseTcepvpmqq-Jucynjn-ay appropriate bed  [place]-Ed-low acuity only, refer after 303 hearing

## 2022-05-26 NOTE — PROGRESS NOTES
Pt  Has glasses, a pair of black boxers, a white binder (top) in pt  ROOM  Pt  Has contacts in a contact case at [place] [address]  Pt  Has fidget toys x2, a rubiks cube, goggles and a cal blanket and a pen in STORAGE  Pt  Has black [employer] head phones, a whit charging block, a black charging cord with a 3 port split at the end, an Apple I-Pad AND keyboard, a black cord for I-Pad, a white stylus for I-Pad all in [employer]

## 2022-05-26 NOTE — ED NOTES
Pt in bathroom brushing teeth at this time       Memo Hackett, [address] [facility]  05/26/22 0775

## 2022-05-26 NOTE — ED NOTES
Attempted to contact patient's mother 4x- Continued to get busy signal     Camille Bears Worker, [place]  05/26/22

## 2022-05-26 NOTE — ED NOTES
Upon transport arriving to facility, pt began to act up and refusing to go on stretcher  Pt states "I have autism and you cant make me go" Rn informed patient that the transport team is taking patient to a facility that can assist with care at this time  Crisis called to bedside to assist with patient at this time  Pt states "my mom promised to be here by 230, I have to wait for her" RN and crisis informed patient that unfortunatley we are unable to wait for mom due to scheduled  time  Pt began screaming "SHE ALWAYS WAITS FOR ME, SHE ALWAYS" RN provided therapeutic communication to comfort patient in time of stress  Crisis attempted to contacted patients mother in the patients room x4 with no response  Pt loaded stretcher and when the transport team was applying seatbelts patient yells "these are restraints, im going to remember this!" RN informed patient that these are not restraints these are seatbelts for patient safety during driving  Transport asked patient if she would like her blanket and hands out from tucked under the blanket to make her feel less constricted  Pt declined at this time  Two belongings bags left behind by transport   Shirley called for transportation of items to facility      Richi Monte RN  05/26/22 7850

## 2022-05-26 NOTE — ED NOTES
Pt given kind bar that was offered by crisis and cough drop per pt request       Eileen Monge, JERO  05/25/22 2026

## 2022-05-26 NOTE — ED NOTES
Pt requesting turkey sandwich  Called house supervisor, Leonel Bradley, to [employer] the [address]  Pt aware will be a little before sandwiches arrive       David PattersonilwenceslaoConemaugh [facility] Center  05/25/22 3024

## 2022-05-26 NOTE — PLAN OF CARE
Problem: PAIN - ADULT  Goal: Verbalizes/displays adequate comfort level or baseline comfort level  Description: Interventions:  - Encourage patient to monitor pain and request assistance  - Assess pain using appropriate pain scale  - Administer analgesics based on type and severity of pain and evaluate response  - Implement non-pharmacological measures as appropriate and evaluate response  - Consider cultural and social influences on pain and pain management  - Notify physician/advanced practitioner if interventions unsuccessful or patient reports new pain  Outcome: Progressing     Problem: SAFETY ADULT  Goal: Maintain or return to baseline ADL function  Description: INTERVENTIONS:  -  Assess patient's ability to carry out ADLs; assess patient's baseline for ADL function and identify physical deficits which impact ability to perform ADLs (bathing, care of mouth/teeth, toileting, grooming, dressing, etc )  - Assess/evaluate cause of self-care deficits   - Assess range of motion  - Assess patient's mobility; develop plan if impaired  - Assess patient's need for assistive devices and provide as appropriate  - Encourage maximum independence but intervene and supervise when necessary  - Involve family in performance of ADLs  - Assess for home care needs following discharge   - Consider OT consult to assist with ADL evaluation and planning for discharge  - Provide patient education as appropriate  Outcome: Progressing     Problem: Alteration in Thoughts and Perception  Goal: Verbalize thoughts and feelings  Description: Interventions:  - Promote a nonjudgmental and trusting relationship with the patient through active listening and therapeutic communication  - Assess patient's level of functioning, behavior and potential for risk  - Engage patient in 1 on 1 interactions  - Encourage patient to express fears, feelings, frustrations, and discuss symptoms    - [place] patient to reality, help patient recognize reality-based thinking   - Administer medications as ordered and assess for potential side effects  - Provide the patient education related to the signs and symptoms of the illness and desired effects of prescribed medications  Outcome: Progressing  Goal: Refrain from acting on delusional thinking/internal stimuli  Description: Interventions:  - Monitor patient closely, per order   - Utilize least restrictive measures   - Set reasonable limits, give positive feedback for acceptable   - Administer medications as ordered and monitor of potential side effects  Outcome: Progressing  Goal: Agree to be compliant with medication regime, as prescribed and report medication side effects  Description: Interventions:  - Offer appropriate PRN medication and supervise ingestion; conduct AIMS, as needed   Outcome: Progressing  Goal: Recognize dysfunctional thoughts, communicate reality-based thoughts at the time of discharge  Description: Interventions:  - Provide medication and psycho-education to assist patient in compliance and developing insight into his/her illness   Outcome: Progressing  Goal: Complete daily ADLs, including personal hygiene independently, as able  Description: Interventions:  - Observe, teach, and assist patient with ADLS  - Monitor and promote a balance of rest/activity, with adequate nutrition and elimination   Outcome: Progressing     Problem: Depression  Goal: Verbalize thoughts and feelings  Description: Interventions:  - Assess and re-assess patient's level of risk   - Engage patient in 1:1 interactions, daily, for a minimum of 15 minutes   - Encourage patient to express feelings, fears, frustrations, hopes   Outcome: Progressing  Goal: Refrain from harming self  Description: Interventions:  - Monitor patient closely, per order   - Supervise medication ingestion, monitor effects and side effects   Outcome: Progressing

## 2022-05-26 NOTE — NURSING NOTE
S: received from [facility] ED via EMS:oriented to [employer] and hydration  B: Patient taken to ED and was 302'd;patient had become delusional,anxious yet denied upon arrival to unit  Patient states she has a history of SA x 2;1 OD in 2018 of which led to ED/charcoal to be administered and placed at [facility] also states she attempted to drown herself,did not care to discuss details  She denied SIB upon admission here although notes state she has a history of cutting herself  Diagnosed with ASD/Aspergers  She states she has gone "downhill and cannot function and 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 [place]  They state that as her stressor and reason for past SA patient is tranferring their gender from female to binary,They/Them to be used during references  A: Patient presented with goggles,binder,earplugs,and computer like device  It was explained  they will need a Dr order for any special requests  Began crying stating "I cannot communicate and live without these devices,"although patient was able to communicate very effectively  After consoling by 3 staff patient finally agreed to give up their accessories until after speaking with MD They deny SI,HI,AH,VH,delusions and agrees to inform staff prior to any self harm  They rate their depression @ 4/10:anxiety @ 8/10 due to inpatient status  They did become cooperative and more appropriate to situation as time passed  They state they become catatonic when commanded to "do something "    R: Assess for behaviors indicating SIB,self harm :frequently remind them to inform staff  Encourage group attendance and speak to with 'soft tones"

## 2022-05-26 NOTE — ED NOTES
Pt requesting pretzels and mustard but RN informed that pt should have a different snack due to gluten allergy  Pt given chickpea puffs from crisis and water at this time       Leslie Dillard, [address] [facility]  05/25/22 2004

## 2022-05-26 NOTE — ED NOTES
Pt requested a warm tea with honey and lemon with deidra crackers  Pt awake writing poems  Ambulatory to bathroom with steady gait       Marybel Yu [facility]  05/26/22 5898

## 2022-05-26 NOTE — ED NOTES
Pt requesting nicotine gum and to speak with her mother on the phone       Fredo Martinez RN  05/26/22 9154

## 2022-05-26 NOTE — ED NOTES
Assumed care of patient at this time, pt resting in room comfortably, pt being monitored by RN on q15 visual observation which is being recorded on ED behavior health observation record       Fredo Martinez RN  05/26/22 4369

## 2022-05-26 NOTE — ED NOTES
Pt given headphones, new pants, and green popper toy per request  Pt has been very polite and cooperative       David Goetz, [facility]  05/25/22 5030

## 2022-05-26 NOTE — ED NOTES
Pt asked for a turkey sandwich but turkey was not available, only ham  Pt explained to RN that they are "kosher" and will not eat ham sandwiches and does not mix meat and cheese together, says this makes pt physically ill  Pt offered uncrustable, and was happy and asked for 2 frozen and a bag of lays potato chips        Charlene Rivas, [address] [place] [address]  05/26/22 0040

## 2022-05-27 PROBLEM — F29 PSYCHOSIS (HCC): Status: ACTIVE | Noted: 2022-05-27

## 2022-05-27 PROBLEM — F84.0 AUTISM: Status: ACTIVE | Noted: 2022-05-27

## 2022-05-27 PROCEDURE — 99223 1ST HOSP IP/OBS HIGH 75: CPT | Performed by: HOSPITALIST

## 2022-05-27 RX ORDER — CHOLECALCIFEROL (VITAMIN D3) 125 MCG
6000 CAPSULE ORAL 3 TIMES DAILY PRN
Status: DISCONTINUED | OUTPATIENT
Start: 2022-05-27 | End: 2022-06-06 | Stop reason: HOSPADM

## 2022-05-27 RX ORDER — LANOLIN ALCOHOL/MO/W.PET/CERES
3 CREAM (GRAM) TOPICAL
Status: DISCONTINUED | OUTPATIENT
Start: 2022-05-27 | End: 2022-06-06 | Stop reason: HOSPADM

## 2022-05-27 RX ADMIN — GLYCERIN, HYPROMELLOSE, POLYETHYLENE GLYCOL 1 DROP: .2; .2; 1 LIQUID OPHTHALMIC at 18:21

## 2022-05-27 RX ADMIN — NICOTINE POLACRILEX 2 MG: 2 GUM, CHEWING BUCCAL at 07:28

## 2022-05-27 RX ADMIN — NICOTINE POLACRILEX 2 MG: 2 GUM, CHEWING BUCCAL at 09:28

## 2022-05-27 RX ADMIN — Medication 3 MG: at 21:57

## 2022-05-27 RX ADMIN — ACETAMINOPHEN 650 MG: 325 TABLET ORAL at 05:51

## 2022-05-27 RX ADMIN — NICOTINE POLACRILEX 2 MG: 2 GUM, CHEWING BUCCAL at 12:16

## 2022-05-27 RX ADMIN — NICOTINE POLACRILEX 2 MG: 2 GUM, CHEWING BUCCAL at 15:39

## 2022-05-27 RX ADMIN — OLANZAPINE 5 MG: 5 TABLET, ORALLY DISINTEGRATING ORAL at 09:18

## 2022-05-27 RX ADMIN — NICOTINE POLACRILEX 2 MG: 2 GUM, CHEWING BUCCAL at 18:19

## 2022-05-27 RX ADMIN — NICOTINE POLACRILEX 2 MG: 2 GUM, CHEWING BUCCAL at 02:28

## 2022-05-27 RX ADMIN — ESCITALOPRAM OXALATE 10 MG: 10 TABLET ORAL at 09:18

## 2022-05-27 RX ADMIN — NICOTINE POLACRILEX 2 MG: 2 GUM, CHEWING BUCCAL at 21:57

## 2022-05-27 RX ADMIN — ACETAMINOPHEN 975 MG: 325 TABLET ORAL at 12:20

## 2022-05-27 RX ADMIN — GLYCERIN, HYPROMELLOSE, POLYETHYLENE GLYCOL 1 DROP: .2; .2; 1 LIQUID OPHTHALMIC at 21:30

## 2022-05-27 NOTE — TREATMENT PLAN
TREATMENT PLAN REVIEW - [address] [facility] [place] 29 y o  1990 adult MRN: [id number]    [facility] [address] Room / Bed: Glen Samano/U 943-13 Encounter: [id number]          Admit Date/Time:  5/26/2022  4:22 PM    Treatment Team: Attending Provider: Rosalio Romero MD; Consulting Physician: Tina Mortimer, MD; Registered Nurse: Ezekiel Marquez RN; Certified Nursing Assistant: Jennifer Erickson; Nursing Student: Thomas Hair; Patient Care Assistant: Cherylene Offer; Recreational Therapist: So Mckinnon; Licensed Practical Nurse: Jatinder Macedo LPN    Diagnosis: Active Problems:    Psychosis [facility])      Patient Strengths/Assets: cooperative, family ties, good past treatment response    Patient Barriers/Limitations: difficulty adapting, patient is on an involuntary commitment, poor insight    Short Term Goals: decrease in paranoid thoughts, decrease in psychotic symptoms, improvement in insight, improvement in reality testing    Long Term Goals: resolution of psychotic symptoms, acceptance of need for psychiatric medications, adequate self care    Progress Towards Goals: starting psychiatric medications as prescribed    Recommended Treatment: medication management, patient medication education, group therapy, milieu therapy, continued Behavioral Health psychiatric evaluation/assessment process    Treatment Frequency: daily medication monitoring, group and milieu therapy daily, monitoring through interdisciplinary rounds, monitoring through weekly patient care conferences    Expected Discharge Date:  6/3/2022    Discharge Plan: discharge to home, referrals as indicated    Treatment Plan Created/Updated By: Rosalio Romero MD

## 2022-05-27 NOTE — PROGRESS NOTES
05/27/22 1000   Activity/Group Checklist   Group   (Creative Expression of Feelings and Emotions)   Attendance Attended   Attendance Duration (min) Greater than 60   Interactions Interacted appropriately   Affect/Mood Appropriate;Calm   Goals Achieved Identified feelings; Identified triggers; Discussed coping strategies; Increased hopefulness; Displayed empathy;Identified resources and support systems; Able to listen to others; Able to engage in interactions; Able to reflect/comment on own behavior;Able to manage/cope with feelings; Able to self-disclose; Able to recieve feedback; Able to give feedback to another

## 2022-05-27 NOTE — PLAN OF CARE
Problem: Ineffective Coping  Goal: Cooperates with admission process  Description: Interventions:   - Complete admission process  Outcome: Progressing  Goal: Identifies ineffective coping skills  Outcome: Progressing  Goal: Identifies healthy coping skills  Outcome: Progressing  Goal: Demonstrates healthy coping skills  Outcome: Progressing  Goal: Participates in unit activities  Description: Interventions:  - Provide therapeutic environment   - Provide required programming   - Redirect inappropriate behaviors   Outcome: Progressing  Goal: Patient/Family participate in treatment and DC plans  Description: Interventions:  - Provide therapeutic environment  Outcome: Progressing  Goal: Patient/Family verbalizes awareness of resources  Outcome: Progressing  Goal: Understands least restrictive measures  Description: Interventions:  - Utilize least restrictive behavior  Outcome: Progressing  Goal: Free from restraint events  Description: - Utilize least restrictive measures   - Provide behavioral interventions   - Redirect inappropriate behaviors   Outcome: Progressing     Problem: Depression  Goal: Treatment Goal: Demonstrate behavioral control of depressive symptoms, verbalize feelings of improved mood/affect, and adopt new coping skills prior to discharge  5/27/2022 1842 by Rose Marie Merritt LPN  Outcome: Progressing  5/27/2022 1417 by Rose Marie Merritt LPN  Outcome: Progressing  Goal: Verbalize thoughts and feelings  Description: Interventions:  - Assess and re-assess patient's level of risk   - Engage patient in 1:1 interactions, daily, for a minimum of 15 minutes   - Encourage patient to express feelings, fears, frustrations, hopes   5/27/2022 1842 by Rose Marie Merritt LPN  Outcome: Progressing  5/27/2022 1417 by Rose Marie Merritt LPN  Outcome: Progressing  Goal: Refrain from harming self  Description: Interventions:  - Monitor patient closely, per order   - Supervise medication ingestion, monitor effects and side effects 5/27/2022 1842 by Baron Wang LPN  Outcome: Progressing  5/27/2022 1417 by Baron Wang LPN  Outcome: Progressing  Goal: Refrain from isolation  Description: Interventions:  - Develop a trusting relationship   - Encourage socialization   5/27/2022 1842 by Baron Wang LPN  Outcome: Progressing  5/27/2022 1417 by Baron Wang LPN  Outcome: Progressing  Goal: Refrain from self-neglect  5/27/2022 1842 by Baron Wang LPN  Outcome: Progressing  5/27/2022 1417 by Baron Wang LPN  Outcome: Progressing  Goal: Attend and participate in unit activities, including therapeutic, recreational, and educational groups  Description: Interventions:  - Provide therapeutic and educational activities daily, encourage attendance and participation, and document same in the medical record   5/27/2022 1842 by Baron Wang LPN  Outcome: Progressing  5/27/2022 1417 by Baron Wang LPN  Outcome: Progressing  Goal: Complete daily ADLs, including personal hygiene independently, as able  Description: Interventions:  - Observe, teach, and assist patient with ADLS  -  Monitor and promote a balance of rest/activity, with adequate nutrition and elimination   5/27/2022 1842 by Baron Wang LPN  Outcome: Progressing  5/27/2022 1417 by Baron Wang LPN  Outcome: Progressing     Problem: Anxiety  Goal: Anxiety is at manageable level  Description: Interventions:  - Assess and monitor patient's anxiety level  - Monitor for signs and symptoms (heart palpitations, chest pain, shortness of breath, headaches, nausea, feeling jumpy, restlessness, irritable, apprehensive)  - Collaborate with interdisciplinary team and initiate plan and interventions as ordered    - Ona patient to unit/surroundings  - Explain treatment plan  - Encourage participation in care  - Encourage verbalization of concerns/fears  - Identify coping mechanisms  - Assist in developing anxiety-reducing skills  - Administer/offer alternative therapies  - Limit or eliminate stimulants  5/27/2022 1842 by Simba Vo LPN  Outcome: Progressing  5/27/2022 1417 by Simba Vo LPN  Outcome: Progressing

## 2022-05-27 NOTE — NURSING NOTE
Giorgi reports that they feels ok for the most part, Giorgi requested eye drops for eye irritation as well as contact solution  Request  Art materials for coloring  Pt observed within the unit, social with peers and staff  who approached They  Observed coloring by the window  with provided art materials nearby the nurses station  PT smiled often and thanked staff often  Patient is pleasant and cooperative, well groomed and good hygiene  Appropriate affect, able to make needs known without difficulty   articulation was coherent, and  language skills were intact  There are no visible signs of delusions, bizarre behaviors, hallucinations, or any other symptoms of psychotic process  Associations are intact, thinking is logical, and thought content appears to be congruent  Suicidal ideation is denied  They agree to come to staff if they begin to feel any of these feelings or  acting on the feelings  Short and long-term memory is intact  Insight and judgment are good  Pt made aware that [facility] will be notified, Pt provided NSS temporarily  Pt has contacts and eyeglasses  art supplies provided

## 2022-05-27 NOTE — H&P
Mekhi Jones  NBV:[id number]    KSM:[id number]  Adm Date: 5/26/2022 1622  4:22 PM   ATT PHY: Carlos Alberto Ching, [address]         Chief Complaint:  worsening psychotic symptoms    History of Presenting Illness: Hayden Aguilar is a(n) 29 y o  adult who is admitted to [address] [place] [address] on involuntary 302 commitment basis  Patient originally presented to [place] [address]  ED on 05/23/2022 by a family friend for bizarre behavior and unable to care for self  Patient examined at bedside  Patient denied any active suicidal homicidal ideations  Patient has history of lactose intolerance and is requesting lactase tablets  Patient would also like eyedrops  Allergies   Allergen Reactions    Bee Venom Anaphylaxis    Gadavist [Gadobutrol] Rash     Treated in ER after contrast injection in MRI dept      Iv Contrast [Iodinated Diagnostic Agents] Itching and Rash     To  ER treated with steroid and IV benadryl s/p contrast injection MRI on 1/23/21  Gadavist    Gluten Meal - Food Allergy GI Intolerance    Lactose - Food Allergy GI Intolerance    Latex Rash       Current Facility-Administered Medications on File Prior to Encounter   Medication Dose Route Frequency Provider Last Rate Last Admin    [DISCONTINUED] escitalopram (LEXAPRO) tablet 10 mg  10 mg Oral Daily Ayush Bucca, DO   10 mg at 05/26/22 0849    [DISCONTINUED] nicotine (NICODERM CQ) 21 mg/24 hr TD 24 hr patch 21 mg  21 mg Transdermal Once Kim Clifton MD   21 mg at 05/25/22 2143    [DISCONTINUED] nicotine (NICODERM CQ) 21 mg/24 hr TD 24 hr patch 21 mg  21 mg Transdermal Once [employer] , DO   21 mg at 05/26/22 0024    [DISCONTINUED] nicotine polacrilex (NICORETTE) gum 2 mg  2 mg Oral Q2H PRN Parris Bang Jr , DO   2 mg at 05/26/22 1252    [DISCONTINUED] OLANZapine (ZyPREXA ZYDIS) dispersible tablet 5 mg  5 mg Oral Daily Law Escobar MD Angelia   5 mg at 05/26/22 0849    [DISCONTINUED] OLANZapine (ZyPREXA) IM injection 5 mg  5 mg Intramuscular Q6H PRN Charlyne Ahumada, MD         Current Outpatient Medications on File Prior to Encounter   Medication Sig Dispense Refill    amphetamine-dextroamphetamine (ADDERALL) 20 mg tablet Take 20 mg by mouth      escitalopram (LEXAPRO) 10 mg tablet 10 mg      amphetamine-dextroamphetamine (ADDERALL) 5 MG tablet 5 mg         Active Ambulatory Problems     Diagnosis Date Noted    No Active Ambulatory Problems     Resolved Ambulatory Problems     Diagnosis Date Noted    No Resolved Ambulatory Problems     Past Medical History:   Diagnosis Date    Autism spectrum        Past Surgical History:   Procedure Laterality Date    TONSILLECTOMY         Social History:   Social History     Socioeconomic History    Marital status: Single     Spouse name: Not on file    Number of children: Not on file    Years of education: Not on file    Highest education level: Not on file   Occupational History    Not on file   Tobacco Use    Smoking status: Never Smoker    Smokeless tobacco: Current User   Substance and Sexual Activity    Alcohol use: Not Currently    Drug use: Yes     Types: Marijuana    Sexual activity: Not on file   Other Topics Concern    Not on file   Social History Narrative    Not on file     Social Determinants of Health     Financial Resource Strain: Not on file   Food Insecurity: Not on file   Transportation Needs: Not on file   Physical Activity: Not on file   Stress: Not on file   Social Connections: Not on file   Intimate Partner Violence: Not on file   Housing Stability: Not on file       Family History: No family history on file  Review of Systems   All other systems reviewed and are negative  Physical Exam   Constitutional: Awake and Alert  Well-developed  No distress  HENT: PERR,EOMI, conjunctiva normal  Head: Normocephalic and atraumatic     Mouth/Throat: Oropharynx is clear and moist     Eyes: Conjunctivae and EOM are normal  Pupils are equal, round, and reactive  Neck: Neck supple  No tracheal deviation present  No thyromegaly present  Cardiovascular: Normal rate, regular rhythm and normal heart sounds  Pulmonary/Chest: Effort normal and breath sounds normal    Abdominal: Soft  Bowel sounds are normal  No distension  No tenderness  Neurological:  No focal deficits  Musculoskeletal:  Nontender spine  Skin: Skin is warm and dry  Charlotte Dies is a(n) 29 y o  adult with psychosis, autism  1  Arthralgias/headache  Patient may take Tylenol as needed  2  Dry eyes  Patient may use artificial tears as needed  3  Tobacco use  Nicorette gum as needed  4  Insomnia  Trazodone as needed  3  Psych with history of autism, psychosis  This is being managed by the psych team     Prognosis: Fair  Discharge Plan: In progress  Advanced Directives: I have discussed in detail with the patient the advanced directives  The patient does not have an appointed POA and does not have a living will  Patient's POA is patient's mother, Anant Mazariegos, whose number is [phone]  When discussing cardiac and pulmonary resuscitation efforts with the patient, the patient wishes to be FULL CODE  I have spent more than 50 minutes gathering data, doing physical examination, and discussing the advanced directives, which was witnessed by caring staff  The patient was discussed with Dr Claudia Dixon and he is in agreement with the above note

## 2022-05-27 NOTE — SOCIAL WORK
SW spoke with pt adoptive mother, Trista Hampton [phone]  Carmela Santana stated pt does not need ipad to communicate, but it can help with pt is emotional  Paper/marker will work in place of ipad  Carmela Santana inquired it pt can have their pop-it fidget toy to use as a coping skill  SW reached out to unit manager regarding this

## 2022-05-27 NOTE — PROGRESS NOTES
05/27/22 1416   Team Meeting   Meeting Type Tx Team Meeting   Team Members Present   Team Members Present Physician;Nurse;   Physician Team Member Dr Anirudh Fabian MD   Nursing Team Member Florinda Grigsby RN   Social Work Team Member Lynn Campbell [place]   Patient/Family Present   Patient Present Yes   Patient's Family Present Yes  (Mother, Anai Mauro via phone call)   Family Relationship Parent   Parent Anai Mauro   Patient and treatment team met and reviewed pt strengths, limitations, coping skills, treatment plan and goals; pt not agreeable and not signed; copy on chart

## 2022-05-27 NOTE — PLAN OF CARE
Problem: Ineffective Coping  Goal: Cooperates with admission process  Description: Interventions:   - Complete admission process  Outcome: Progressing  Goal: Identifies ineffective coping skills  Outcome: Progressing  Goal: Identifies healthy coping skills  Outcome: Progressing  Goal: Demonstrates healthy coping skills  Outcome: Progressing  Goal: Participates in unit activities  Description: Interventions:  - Provide therapeutic environment   - Provide required programming   - Redirect inappropriate behaviors   Outcome: Progressing  Goal: Patient/Family participate in treatment and DC plans  Description: Interventions:  - Provide therapeutic environment  Outcome: Progressing  Goal: Patient/Family verbalizes awareness of resources  Outcome: Progressing  Goal: Understands least restrictive measures  Description: Interventions:  - Utilize least restrictive behavior  Outcome: Progressing  Goal: Free from restraint events  Description: - Utilize least restrictive measures   - Provide behavioral interventions   - Redirect inappropriate behaviors   Outcome: Progressing     Problem: Depression  Goal: Treatment Goal: Demonstrate behavioral control of depressive symptoms, verbalize feelings of improved mood/affect, and adopt new coping skills prior to discharge  Outcome: Progressing  Goal: Verbalize thoughts and feelings  Description: Interventions:  - Assess and re-assess patient's level of risk   - Engage patient in 1:1 interactions, daily, for a minimum of 15 minutes   - Encourage patient to express feelings, fears, frustrations, hopes   Outcome: Progressing  Goal: Refrain from harming self  Description: Interventions:  - Monitor patient closely, per order   - Supervise medication ingestion, monitor effects and side effects   Outcome: Progressing  Goal: Refrain from isolation  Description: Interventions:  - Develop a trusting relationship   - Encourage socialization   Outcome: Progressing  Goal: Refrain from self-neglect  Outcome: Progressing  Goal: Attend and participate in unit activities, including therapeutic, recreational, and educational groups  Description: Interventions:  - Provide therapeutic and educational activities daily, encourage attendance and participation, and document same in the medical record   Outcome: Progressing  Goal: Complete daily ADLs, including personal hygiene independently, as able  Description: Interventions:  - Observe, teach, and assist patient with ADLS  -  Monitor and promote a balance of rest/activity, with adequate nutrition and elimination   Outcome: Progressing     Problem: Anxiety  Goal: Anxiety is at manageable level  Description: Interventions:  - Assess and monitor patient's anxiety level  - Monitor for signs and symptoms (heart palpitations, chest pain, shortness of breath, headaches, nausea, feeling jumpy, restlessness, irritable, apprehensive)  - Collaborate with interdisciplinary team and initiate plan and interventions as ordered    - [place] patient to unit/surroundings  - Explain treatment plan  - Encourage participation in care  - Encourage verbalization of concerns/fears  - Identify coping mechanisms  - Assist in developing anxiety-reducing skills  - Administer/offer alternative therapies  - Limit or eliminate stimulants  Outcome: Progressing

## 2022-05-27 NOTE — PROGRESS NOTES
05/27/22 1430   Activity/Group Checklist   Group   (Creative Expression)   Attendance Attended   Attendance Duration (min) 46-60   Interactions Interacted appropriately   Affect/Mood Appropriate;Bright   Goals Achieved Identified triggers; Identified feelings; Discussed coping strategies; Identified resources and support systems; Able to listen to others; Able to engage in interactions; Able to reflect/comment on own behavior;Able to manage/cope with feelings

## 2022-05-27 NOTE — PROGRESS NOTES
05/27/22 0754   Activity/Group Checklist   Group   [employer] and Check-In)   Attendance Did not attend  (Group offered, PT elected to remain in room)

## 2022-05-27 NOTE — SOCIAL WORK
SW met with pt  Pt states dep 3/10; anx 4-5/10  Pt denies AH/VH       Pt needs are met  Pt has access to phone  Pt has access to one pop-it  Pt has access to group  Pt has access to common area for activities and peer support       Pt asked to use the phone to call their mother  Pt need was met

## 2022-05-27 NOTE — PROGRESS NOTES
05/27/22    Team Meeting   Meeting Type Daily Rounds   Team Members Present   Team Members Present Physician;Nurse;   Physician Team Member Dr Freddy Johnson MD, HOSP DR KERRY AGARWAL, [address] [place] [address]   Nursing Team Member Franklin Flower, JERO   [facility]   Patient/Family Present   Patient Present No   Patient's Family Present No     Pt is new 302 admit, 303 approved today 5/27/22 for up to 20 days, nonbinary, they/them pronouns, speaks softly, uses binder, emotionally labile

## 2022-05-27 NOTE — NURSING NOTE
Patient observed with difficulty falling asleep  Pt observed after 0230 sleeping the majority of the night without obvious distress with even respirations Per rounds

## 2022-05-27 NOTE — SOCIAL WORK
Hearing Documentation    303 hearing held today;  in attendance:  Atty             - ;  [address] [facility] PD office; staff psych; ;  pt;  [phone] recommendation upheld for up to an additional 20 days of inpt treatment at [facility];  303 expires: 6/16/2022

## 2022-05-27 NOTE — NURSING NOTE
Patient awake on milieu  Appears flat and pleasant  Good eye contact  Schedule PO medication administered as ordered  Endorse  anxiety 4/10 , depression 3/10  Report they  use reading and  Coloring as a coping skills, which is currently  effective   Denies hallucination, HI, SI  Attend group  Social with selective  Peers  Continue on safety checks

## 2022-05-27 NOTE — PROGRESS NOTES
05/27/22 1408   Referral Data   Referral Source Other (Comment)   Referral Name [facility] ED   Referral Reason [address] [facility] [address] [place] [address]   Readmission Root Cause   30 Day Readmission No   Patient Information   Mental Status Confused   Primary Caregiver Family  (Adoptive Family)   Support System Immediate family   Legal Information   Current Status: 18  ([address] approved 5/27/22)   Activities of Daily Living Prior to Admission   Functional Status Independent   Assistive Device No device needed   Ambulation Independent   Access to Firearms   Access to Firearms No   [address] Unemployed  (Recently unemployed)   [employer] [facility]: Drives Self
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
## 2022-05-28 LAB
25(OH)D3 SERPL-MCNC: 49 NG/ML (ref 30–100)
ALBUMIN SERPL BCP-MCNC: 4.7 G/DL (ref 3.5–5)
ALP SERPL-CCNC: 64 U/L (ref 46–116)
ALT SERPL W P-5'-P-CCNC: 28 U/L (ref 7–52)
ANION GAP SERPL CALCULATED.3IONS-SCNC: 6 MMOL/L (ref 4–13)
AST SERPL W P-5'-P-CCNC: 15 U/L (ref 5–45)
BASOPHILS # BLD AUTO: 0.09 THOUSANDS/ΜL (ref 0–0.1)
BASOPHILS NFR BLD AUTO: 1 % (ref 0–1)
BILIRUB SERPL-MCNC: 0.73 MG/DL (ref 0.2–1)
BUN SERPL-MCNC: 15 MG/DL (ref 5–25)
CALCIUM SERPL-MCNC: 9.8 MG/DL (ref 8.4–10.2)
CHLORIDE SERPL-SCNC: 101 MMOL/L (ref 96–108)
CHOLEST SERPL-MCNC: 136 MG/DL
CO2 SERPL-SCNC: 32 MMOL/L (ref 21–32)
CREAT SERPL-MCNC: 0.77 MG/DL (ref 0.6–1.3)
EOSINOPHIL # BLD AUTO: 0.19 THOUSAND/ΜL (ref 0–0.61)
EOSINOPHIL NFR BLD AUTO: 2 % (ref 0–6)
ERYTHROCYTE [DISTWIDTH] IN BLOOD BY AUTOMATED COUNT: 12.7 % (ref 11.6–15.1)
FOLATE SERPL-MCNC: 18 NG/ML (ref 3.1–17.5)
GLUCOSE P FAST SERPL-MCNC: 79 MG/DL (ref 65–99)
GLUCOSE SERPL-MCNC: 79 MG/DL (ref 65–140)
HCT VFR BLD AUTO: 45.9 % (ref 36.5–46.1)
HDLC SERPL-MCNC: 37 MG/DL
HGB BLD-MCNC: 14.9 G/DL (ref 12–15.4)
IMM GRANULOCYTES # BLD AUTO: 0.05 THOUSAND/UL (ref 0–0.2)
IMM GRANULOCYTES NFR BLD AUTO: 1 % (ref 0–2)
LDLC SERPL CALC-MCNC: 81 MG/DL (ref 0–100)
LYMPHOCYTES # BLD AUTO: 2.58 THOUSANDS/ΜL (ref 0.6–4.47)
LYMPHOCYTES NFR BLD AUTO: 25 % (ref 14–44)
MCH RBC QN AUTO: 30.6 PG (ref 26.8–34.3)
MCHC RBC AUTO-ENTMCNC: 32.5 G/DL (ref 31.4–37.4)
MCV RBC AUTO: 94 FL (ref 82–98)
MONOCYTES # BLD AUTO: 0.96 THOUSAND/ΜL (ref 0.17–1.22)
MONOCYTES NFR BLD AUTO: 9 % (ref 4–12)
NEUTROPHILS # BLD AUTO: 6.44 THOUSANDS/ΜL (ref 1.85–7.62)
NEUTS SEG NFR BLD AUTO: 62 % (ref 43–75)
NONHDLC SERPL-MCNC: 99 MG/DL
NRBC BLD AUTO-RTO: 0 /100 WBCS
PLATELET # BLD AUTO: 415 THOUSANDS/UL (ref 149–390)
PMV BLD AUTO: 10.4 FL (ref 8.9–12.7)
POTASSIUM SERPL-SCNC: 4 MMOL/L (ref 3.5–5.3)
PROT SERPL-MCNC: 7.4 G/DL (ref 6.4–8.4)
RBC # BLD AUTO: 4.87 MILLION/UL (ref 3.88–5.12)
SODIUM SERPL-SCNC: 139 MMOL/L (ref 135–147)
TRIGL SERPL-MCNC: 92 MG/DL
TSH SERPL DL<=0.05 MIU/L-ACNC: 1.24 UIU/ML (ref 0.45–4.5)
VIT B12 SERPL-MCNC: 512 PG/ML (ref 100–900)
WBC # BLD AUTO: 10.31 THOUSAND/UL (ref 4.31–10.16)

## 2022-05-28 PROCEDURE — 82746 ASSAY OF FOLIC ACID SERUM: CPT

## 2022-05-28 PROCEDURE — 84443 ASSAY THYROID STIM HORMONE: CPT

## 2022-05-28 PROCEDURE — 99232 SBSQ HOSP IP/OBS MODERATE 35: CPT | Performed by: PSYCHIATRY & NEUROLOGY

## 2022-05-28 PROCEDURE — 82306 VITAMIN D 25 HYDROXY: CPT

## 2022-05-28 PROCEDURE — 80053 COMPREHEN METABOLIC PANEL: CPT

## 2022-05-28 PROCEDURE — 85025 COMPLETE CBC W/AUTO DIFF WBC: CPT

## 2022-05-28 PROCEDURE — 80061 LIPID PANEL: CPT

## 2022-05-28 PROCEDURE — 82607 VITAMIN B-12: CPT

## 2022-05-28 RX ORDER — OLANZAPINE 10 MG/1
10 TABLET, ORALLY DISINTEGRATING ORAL DAILY
Status: DISCONTINUED | OUTPATIENT
Start: 2022-05-29 | End: 2022-06-06 | Stop reason: HOSPADM

## 2022-05-28 RX ADMIN — GLYCERIN, HYPROMELLOSE, POLYETHYLENE GLYCOL 1 DROP: .2; .2; 1 LIQUID OPHTHALMIC at 19:52

## 2022-05-28 RX ADMIN — NICOTINE POLACRILEX 2 MG: 2 GUM, CHEWING BUCCAL at 09:05

## 2022-05-28 RX ADMIN — ESCITALOPRAM OXALATE 10 MG: 10 TABLET ORAL at 08:09

## 2022-05-28 RX ADMIN — NICOTINE POLACRILEX 2 MG: 2 GUM, CHEWING BUCCAL at 14:36

## 2022-05-28 RX ADMIN — NICOTINE POLACRILEX 2 MG: 2 GUM, CHEWING BUCCAL at 21:03

## 2022-05-28 RX ADMIN — Medication 3 MG: at 21:00

## 2022-05-28 RX ADMIN — OLANZAPINE 5 MG: 5 TABLET, ORALLY DISINTEGRATING ORAL at 08:09

## 2022-05-28 RX ADMIN — GLYCERIN, HYPROMELLOSE, POLYETHYLENE GLYCOL 1 DROP: .2; .2; 1 LIQUID OPHTHALMIC at 06:33

## 2022-05-28 RX ADMIN — GLYCERIN, HYPROMELLOSE, POLYETHYLENE GLYCOL 1 DROP: .2; .2; 1 LIQUID OPHTHALMIC at 04:30

## 2022-05-28 RX ADMIN — NICOTINE POLACRILEX 2 MG: 2 GUM, CHEWING BUCCAL at 17:16

## 2022-05-28 RX ADMIN — ACETAMINOPHEN 975 MG: 325 TABLET ORAL at 09:05

## 2022-05-28 RX ADMIN — NICOTINE POLACRILEX 2 MG: 2 GUM, CHEWING BUCCAL at 11:43

## 2022-05-28 NOTE — NURSING NOTE
Patient noted on unit   Continue with bizarre behavior  Constant  requesting different  items not able to have on unit  Explain what she able to have on the unit  Report anxiety 3/10 and  Depression 4/10  Patient was given butterfly  popin to help with coping  skill  which has some effects  Attend group  Social with selective  peers  Continue on safety checks

## 2022-05-28 NOTE — NURSING NOTE
Patient observed with poor sleep throughout the night  martitatenluis showered around 0130 as a means to promote sleep, this was not effective after they/them showered, slept for no more than 2 hours  Patient was back up at the nurses station with artwork and a request for eye drops   Patient did remain quiet without behaviors while awake

## 2022-05-28 NOTE — NURSING NOTE
Patient handed this writer a halls wrapper shaped as a paper plane labeled with a rappers name "Jamin-Venkatesh" Patient requested to go into the dayroom, Pt encouraged back to they/thems room until the dayroom is opened

## 2022-05-28 NOTE — NURSING NOTE
Giorgi reports that they feels ok for the most part, Giorgi shared that a hearing tookplace today which changed her status adding more times inpatient  Giorgi expressed feeling disappointed with he outcome but is optimists and feels they/them will be successful in treatment while here  Kevin requested thy/them eye drops, an order for melatonin over trazodone and nicorette gum       Pt observed within the unit, social with peers and staff  Observed coloring by the window or in the buchanan by her room door   Observed on the unit phone in a conversation with an appropriate tone and demeanor  PT smiled often and thanked staff often      Patient is pleasant and cooperative, well groomed and good hygiene  Appropriate affect, able to make needs known without difficulty and appropriate coherancyt  There were  no visible signs of delusions, bizarre behaviors, hallucinations, or any other symptoms of psychotic process  Associations are intact, thinking is logical, and thought content appears to be congruent  Suicidal ideation is denied  They/them agrees to come to staff if they/them begin to feel any of these feelings or the need to act on it  Insight and judgment are good         Pt made aware that [facility] was be notified and the order for melatonin was obtained, melatonin was administered for sleep  Pt made use of dry eye relief drops, nicorette gum provided

## 2022-05-28 NOTE — PLAN OF CARE
Problem: Depression  Goal: Treatment Goal: Demonstrate behavioral control of depressive symptoms, verbalize feelings of improved mood/affect, and adopt new coping skills prior to discharge  5/28/2022 1745 by Robb Patel LPN  Outcome: Progressing  5/28/2022 0847 by Robb Patel LPN  Outcome: Progressing  Goal: Verbalize thoughts and feelings  Description: Interventions:  - Assess and re-assess patient's level of risk   - Engage patient in 1:1 interactions, daily, for a minimum of 15 minutes   - Encourage patient to express feelings, fears, frustrations, hopes   5/28/2022 1745 by Robb Patel LPN  Outcome: Progressing  5/28/2022 0847 by Robb Patel LPN  Outcome: Progressing  Goal: Refrain from harming self  Description: Interventions:  - Monitor patient closely, per order   - Supervise medication ingestion, monitor effects and side effects   5/28/2022 1745 by Robb Patel LPN  Outcome: Progressing  5/28/2022 0847 by Robb Patel LPN  Outcome: Progressing  Goal: Refrain from isolation  Description: Interventions:  - Develop a trusting relationship   - Encourage socialization   5/28/2022 1745 by Robb Patel LPN  Outcome: Progressing  5/28/2022 0847 by Robb Patel LPN  Outcome: Progressing  Goal: Refrain from self-neglect  5/28/2022 1745 by Robb Patel LPN  Outcome: Progressing  5/28/2022 0847 by Robb Patel LPN  Outcome: Progressing  Goal: Attend and participate in unit activities, including therapeutic, recreational, and educational groups  Description: Interventions:  - Provide therapeutic and educational activities daily, encourage attendance and participation, and document same in the medical record   5/28/2022 1745 by Robb Patel LPN  Outcome: Progressing  5/28/2022 0847 by Robb Patel LPN  Outcome: Progressing  Goal: Complete daily ADLs, including personal hygiene independently, as able  Description: Interventions:  - Observe, teach, and assist patient with ADLS  -  Monitor and promote a balance of rest/activity, with adequate nutrition and elimination   5/28/2022 1745 by Kingsley Nunn LPN  Outcome: Progressing  5/28/2022 0847 by Kingsley Nunn LPN  Outcome: Progressing     Problem: Anxiety  Goal: Anxiety is at manageable level  Description: Interventions:  - Assess and monitor patient's anxiety level  - Monitor for signs and symptoms (heart palpitations, chest pain, shortness of breath, headaches, nausea, feeling jumpy, restlessness, irritable, apprehensive)  - Collaborate with interdisciplinary team and initiate plan and interventions as ordered    - [place] patient to unit/surroundings  - Explain treatment plan  - Encourage participation in care  - Encourage verbalization of concerns/fears  - Identify coping mechanisms  - Assist in developing anxiety-reducing skills  - Administer/offer alternative therapies  - Limit or eliminate stimulants  5/28/2022 1745 by Kingsley Nunn LPN  Outcome: Progressing  5/28/2022 0847 by Kingsley Nunn LPN  Outcome: Progressing

## 2022-05-28 NOTE — PROGRESS NOTES
[id number] [facility] 29 [facility]  [facility] MRN: [id number]   Unit/Bed#: -01 Encounter: [id number]    Behavior over the last 24 hours:  Raquel Hickey was seen in follow-up for continuation of care  Per report patient was frequently up throughout the evening and did make an attempt to shower as a means to promote sleep however was not effective  Otherwise since admission, they have been cooperative and tolerating medications  Has been on the community and attending groups, interacting with select peers  Otherwise no acute events reported overnight  On presentation, patient was in bed room crouched in the corner pacing away from writer crying and bed  They was very labile throughout interview with irritable edge  They had recently been told by staff that she was unable to hide food items in her room  Does seem to exhibit periods of manipulative and maladaptive behavior, often requesting writer if they would be allowed to eat in her room as well as get her items from home despite being told by staff that this was not an option  Does exhibit bizarre, illogical and delusional thinking stating that she cannot eat in the dining room because, I am anorexic and bulimic  Textures of food or weird to me  It's like I am robotic " Was later requesting heart fluids because, I need tea to keep my behaviors under control and coordinate my lymphatic systems    Continues to be overtly psychotic  Otherwise is denying SI/HI/AVH      Sleep: difficulty falling asleep, restless sleep  Appetite: normal  Medication side effects: No   ROS: no complaints, all other systems are negative    Mental Status Evaluation:    Appearance:  age appropriate, casually dressed, adequate grooming   Behavior:  bizarre, demanding, uncooperative   Speech:  increased rate, varible volume   Mood:  depressed   Affect:  labile, increased in intensity   Thought Process:  disorganized, illogical   Associations: tangential associations   Thought Content:  paranoid ideation, Preservative on unit rules and food items   Perceptual Disturbances: none   Risk Potential: Suicidal ideation - None at present  Homicidal ideation - None at present  Potential for aggression - No   Sensorium:  oriented to person and place   Memory:  recent and remote memory: unable to assess due to lack of cooperation   Consciousness:  alert and awake   Attention/Concentration: attention span and concentration appear shorter than expected for age   Insight:  poor   Judgment: poor   Gait/Station: normal gait/station   Motor Activity: no abnormal movements     Vital signs in last 24 hours:    Temp:  [98 °F (36 7 °C)-98 3 °F (36 8 °C)] 98 °F (36 7 °C)  HR:  [62-65] 65  Resp:  [18] 18  BP: (157-160)/(92-95) 160/95    Laboratory results: I have personally reviewed all pertinent laboratory/tests results    Results from the past 24 hours:   Recent Results (from the past 24 hour(s))   CBC and differential    Collection Time: 05/28/22  5:17 AM   Result Value Ref Range    WBC 10 31 (H) 4 31 - 10 16 Thousand/uL    RBC 4 87 3 88 - 5 12 Million/uL    Hemoglobin 14 9 12 0 - 15 4 g/dL    Hematocrit 45 9 36 5 - 46 1 %    MCV 94 82 - 98 fL    MCH 30 6 26 8 - 34 3 pg    MCHC 32 5 31 4 - 37 4 g/dL    RDW 12 7 11 6 - 15 1 %    MPV 10 4 8 9 - 12 7 fL    Platelets 036 (H) 141 - 390 Thousands/uL    nRBC 0 /100 WBCs    Neutrophils Relative 62 43 - 75 %    Immat GRANS % 1 0 - 2 %    Lymphocytes Relative 25 14 - 44 %    Monocytes Relative 9 4 - 12 %    Eosinophils Relative 2 0 - 6 %    Basophils Relative 1 0 - 1 %    Neutrophils Absolute 6 44 1 85 - 7 62 Thousands/µL    Immature Grans Absolute 0 05 0 00 - 0 20 Thousand/uL    Lymphocytes Absolute 2 58 0 60 - 4 47 Thousands/µL    Monocytes Absolute 0 96 0 17 - 1 22 Thousand/µL    Eosinophils Absolute 0 19 0 00 - 0 61 Thousand/µL    Basophils Absolute 0 09 0 00 - 0 10 Thousands/µL   Comprehensive metabolic panel    Collection Time: 05/28/22  5:17 AM   Result Value Ref Range    Sodium 139 135 - 147 mmol/L    Potassium 4 0 3 5 - 5 3 mmol/L    Chloride 101 96 - 108 mmol/L    CO2 32 21 - 32 mmol/L    ANION GAP 6 4 - 13 mmol/L    BUN 15 5 - 25 mg/dL    Creatinine 0 77 0 60 - 1 30 mg/dL    Glucose 79 65 - 140 mg/dL    Glucose, Fasting 79 65 - 99 mg/dL    Calcium 9 8 8 4 - 10 2 mg/dL    AST 15 5 - 45 U/L    ALT 28 7 - 52 U/L    Alkaline Phosphatase 64 46 - 116 U/L    Total Protein 7 4 6 4 - 8 4 g/dL    Albumin 4 7 3 5 - 5 0 g/dL    Total Bilirubin 0 73 0 20 - 1 00 mg/dL    eGFR     Lipid panel    Collection Time: 05/28/22  5:17 AM   Result Value Ref Range    Cholesterol 136 See Comment mg/dL    Triglycerides 92 See Comment mg/dL    HDL, Direct 37 mg/dL    LDL Calculated 81 0 - 100 mg/dL    Non-HDL-Chol (CHOL-HDL) 99 mg/dl   TSH, 3rd generation    Collection Time: 05/28/22  5:17 AM   Result Value Ref Range    TSH 3RD GENERATON 1 244 0 450 - 4 500 uIU/mL     Most Recent Labs:   Lab Results   Component Value Date    WBC 10 31 (H) 05/28/2022    RBC 4 87 05/28/2022    HGB 14 9 05/28/2022    HCT 45 9 05/28/2022     (H) 05/28/2022    RDW 12 7 05/28/2022    NEUTROABS 6 44 05/28/2022    SODIUM 139 05/28/2022    K 4 0 05/28/2022     05/28/2022    CO2 32 05/28/2022    BUN 15 05/28/2022    CREATININE 0 77 05/28/2022    GLUC 79 05/28/2022    CALCIUM 9 8 05/28/2022    AST 15 05/28/2022    ALT 28 05/28/2022    ALKPHOS 64 05/28/2022    TP 7 4 05/28/2022    ALB 4 7 05/28/2022    TBILI 0 73 05/28/2022    CHOLESTEROL 136 05/28/2022    HDL 37 05/28/2022    TRIG 92 05/28/2022    LDLCALC 81 05/28/2022    NONHDLC 99 05/28/2022    [id number] 1 244 05/28/2022    PREGUR negative 05/23/2022       Progress Toward Goals: No change    Assessment/Plan   Principal Problem:    Psychosis (HCC)  Active Problems:    Autism      Recommended Treatment:     Planned medication and treatment changes:     All current active medications have been reviewed  Encourage group therapy, milieu therapy and occupational therapy  Behavioral Health checks every 7 minutes  Increase Zyprexa to 10 mg p o  HS to target mood stability and psychosis  Discharge disposition remains ongoing    Current Facility-Administered Medications   Medication Dose Route Frequency Provider Last Rate    acetaminophen  650 mg Oral Q6H PRN Bl[place]imir Point Medei, CRNP      acetaminophen  650 mg Oral Q4H PRN Bladimir Point Medei, CRNP      acetaminophen  975 mg Oral Q6H PRN Bladimir [place] Medei, CRNP      aluminum-magnesium hydroxide-simethicone  30 mL Oral Q4H PRN Bladimir Point Medei, CRNP      haloperidol lactate  2 5 mg Intramuscular Q6H PRN Max 4/day Bladimir Point Medei, CRNP      And    LORazepam  1 mg Intramuscular Q6H PRN Max 4/day Bladmiir Point Medei, CRNP      And    benztropine  0 5 mg Intramuscular Q6H PRN Max 4/day Bladimir [place] Medei, CRNP      haloperidol lactate  5 mg Intramuscular Q4H PRN Max 4/day Bladimir [place] Medei, CRNP      And    LORazepam  2 mg Intramuscular Q4H PRN Max 4/day Bladimir [place] Medei, CRNP      And    benztropine  1 mg Intramuscular Q4H PRN Max 4/day Bladimir [place] Medei, CRNP      benztropine  1 mg Oral Q6H PRN Bladimir [place] Medei, CRNP      escitalopram  10 mg Oral Daily Christiane M Medei, CRNP      glycerin-hypromellose-  1 drop Both Eyes Q3H PRN Mirella Jauregui PA-C      haloperidol  2 mg Oral Q4H PRN Max 6/day Christiane M Medei, CRNP      haloperidol  5 mg Oral Q6H PRN Max 4/day Bladimir [place] Medei, CRNP      haloperidol  5 mg Oral Q4H PRN Max 4/day Bladimir [place] Medei, CRNP      hydrOXYzine HCL  25 mg Oral Q6H PRN Max 4/day Bladimir [place] Medei, CRNP      hydrOXYzine HCL  50 mg Oral Q4H PRN Max 4/day Bladimir Point Medei, CRNP      Or    LORazepam  1 mg Intramuscular Q4H PRN Bladimir [place] Medei, CRNP      lactase  6,000 Units Oral TID PRN ANGELA FarrellC      LORazepam  1 mg Oral Q6H PRN Bladimir [place] Medei, CRNP      Or    LORazepam  2 mg Intramuscular Q6H PRN Max 3/day Bladimir [place] Medei, CRNP      melatonin  3 mg Oral HS Anika Whaley, MD      nicotine polacrilex  2 mg Oral Q2H PRN Teddy HuguWHIT Albarran      OLANZapine  5 mg Oral Daily WHIT Benoit      polyethylene glycol  17 g Oral Daily PRN Teddy Huguenin WHIT [place]       Risks / Benefits of Treatment:    Risks, benefits, and possible side effects of medications explained to patient and patient verbalizes understanding and agreement for treatment  Counseling / Coordination of Care: Total floor / unit time spent today 20 minutes  Greater than 50% of total time was spent with the patient and / or family counseling and / or coordination of care  A description of counseling / coordination of care:  Patient's progress discussed with staff in treatment team meeting  Medications, treatment progress and treatment plan reviewed with patient      Hoa Raymond PA-C 05/28/22

## 2022-05-28 NOTE — PROGRESS NOTES
Progress Note - Primitivo Garland 29 y o  adult MRN: [id number]    Unit/Bed#: [facility] [[email or web address] 251-01 Encounter: [id number]        Subjective:   Patient seen and examined at bedside after reviewing the chart and discussing the case with the caring staff  Patient examined at bedside  Patient complaining of chronic joint pains and is requesting something for it  Also requested contact solution and was told family needed to bring in as it is not available in hospital     Vitamin-D, vitamin B12 and folate pending  Physical Exam   Vitals: Blood pressure 160/95, pulse 65, temperature 98 °F (36 7 °C), temperature source Temporal, resp  rate 18, height 5' 3" (1 6 m), weight 67 1 kg (148 lb), last menstrual period 05/23/2022, SpO2 95 %  ,Body mass index is 26 22 kg/m²  Constitutional: Patient appears well-developed  HEENT: PERR, EOMI, MMM  Cardiovascular: Normal rate and regular rhythm  Pulmonary/Chest: Effort normal and breath sounds normal    Abdomen: Soft, + BS, NT    Assessment/Plan:  Juliana Garland is a(n) 29 y o  adult with psychosis, autism      1  Arthralgias/headache  Patient may take Tylenol as needed  Ordered Voltaren gel to use on as-needed basis  2  Dry eyes  Patient may use artificial tears as needed  3  Tobacco use  Nicorette gum as needed  4  Insomnia  Trazodone as needed  The patient was discussed with Dr Denise Hardin and he is in agreement with the above note

## 2022-05-29 PROCEDURE — 99232 SBSQ HOSP IP/OBS MODERATE 35: CPT | Performed by: PSYCHIATRY & NEUROLOGY

## 2022-05-29 RX ADMIN — NICOTINE POLACRILEX 2 MG: 2 GUM, CHEWING BUCCAL at 19:42

## 2022-05-29 RX ADMIN — OLANZAPINE 10 MG: 10 TABLET, ORALLY DISINTEGRATING ORAL at 08:45

## 2022-05-29 RX ADMIN — NICOTINE POLACRILEX 2 MG: 2 GUM, CHEWING BUCCAL at 15:44

## 2022-05-29 RX ADMIN — NICOTINE POLACRILEX 2 MG: 2 GUM, CHEWING BUCCAL at 05:17

## 2022-05-29 RX ADMIN — BENZTROPINE MESYLATE 1 MG: 1 TABLET ORAL at 15:29

## 2022-05-29 RX ADMIN — LORAZEPAM 1 MG: 1 TABLET ORAL at 15:29

## 2022-05-29 RX ADMIN — NICOTINE POLACRILEX 2 MG: 2 GUM, CHEWING BUCCAL at 08:57

## 2022-05-29 RX ADMIN — HALOPERIDOL 5 MG: 5 TABLET ORAL at 15:29

## 2022-05-29 RX ADMIN — ESCITALOPRAM OXALATE 10 MG: 10 TABLET ORAL at 08:45

## 2022-05-29 RX ADMIN — GLYCERIN, HYPROMELLOSE, POLYETHYLENE GLYCOL 1 DROP: .2; .2; 1 LIQUID OPHTHALMIC at 16:23

## 2022-05-29 NOTE — NURSING NOTE
Patient requested to take a warm shower to promote sleep  Patient took a 15 minute shower and returned to bedroom

## 2022-05-29 NOTE — NURSING NOTE
Giorgi was visible on the unit, they/them requested they/thems black binder and underwear and a fresh set of clothing from personal belongings  Giorgi expressed feeling  happy about becoming close with a peer who has been very supportive and protective  Patient request eye drops for irritation  Giorgi was observed through out the unit, social with selective peers, social with staff , pleasant and manerable with her needs  Giorgi expressed that while she does communicate in a clear and appropriate manner communicattion such as excessive talking can exhaust they/them from a sensory perspective relating to Autism  Giorgi expressed that having background music or working on art projects often makes communication much easier for they/them  Giorgi spent a good amount of time with this writer talking about her love for helping/supporting Children with autism, the support of her adoptive mother and grandmother  Giorgi  Had good insight on her mental health and how they/them was declining, Edouard expressed being hopeful of when she is more stable and ready for discharge that 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

## 2022-05-29 NOTE — NURSING NOTE
Pt AAOX4, calm, cooperative, and med compliant  Pt denies all SI, HI, AVH, depression, and anxiety  Pt many somatic complaints throughout the shift that were ever changing; for instance pt stated allowed that she was catatonic while restless in bed, then got up and joined peers and was social  Of note pt was never non verbal with writer and communicated well for entirety of shift

## 2022-05-29 NOTE — NURSING NOTE
Patient request to take a 5 min shower before breakfast  Patient showered without any issue independently

## 2022-05-29 NOTE — NURSING NOTE
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

## 2022-05-29 NOTE — PROGRESS NOTES
Progress Note - Leon Garland 29 y o  adult MRN: [id number]    Unit/Bed#: [facility] 251-01 Encounter: [id number]        Subjective:   Patient seen and examined at bedside after reviewing the chart and discussing the case with the caring staff  Patient examined at bedside  Patient has no acute complaints  Patient's labs from 05/20/2022 showed vitamin B12 at 512 pg/mL, vitamin-D at 49 0 ng/mL, folate 18 0 ng/mL  Physical Exam   Vitals: Blood pressure 148/98, pulse 78, temperature 98 2 °F (36 8 °C), temperature source Temporal, resp  rate 18, height 5' 3" (1 6 m), weight 67 1 kg (148 lb), last menstrual period 05/23/2022, SpO2 95 %  ,Body mass index is 26 22 kg/m²  Constitutional: Patient appears in no acute distress  HEENT: PERR, EOMI, MMM  Cardiovascular: Normal rate and regular rhythm  Pulmonary/Chest: Effort normal and breath sounds normal    Abdomen:  Nondistended  Assessment/Plan:  Rosa Sandhu is a(n) 29 y o  adult with psychosis, autism      1  Arthralgias/headache  Patient may take Tylenol as needed, Voltaren gel to use on as-needed basis  2  Dry eyes  Patient may use artificial tears as needed  3  Tobacco use  Nicorette gum as needed  4  Insomnia  Melatonin at bedtime, Trazodone as needed  The patient was discussed with Dr Rosetta Mohamud and he is in agreement with the above note

## 2022-05-29 NOTE — NURSING NOTE
Xavier was  observed in the [place]anan supporting a peer who was also In the [place] crying/sobbing feeling overall depressed  Patient expressed kind and supportive words/interventions to peer that personally have tried in regards to coping skills during difficult times  Peer had a positive response to xavier and stopped crying also engaged in topic

## 2022-05-29 NOTE — PROGRESS NOTES
Progress Note - [id number] [facility] OCTAVIO Garland 29 y o  adult MRN: [id number]   Unit/Bed#: -01 Encounter: [id number]    Behavior over the last 24 hours:  Unchanged    Esequiel Flanagan was seen in follow-up for continuation of care  Per report, patient was up throughout the evening and exhibited poor sleep  Otherwise there were no significant events reported overnight - does seem to exhibit some manipulative and attention seeking behavior  Recently controlled without any significant behavioral outbursts or agitation  Accepting of medications and meals  On presentation, Giorgi was dressed in regular attire standing outside in [place]  Initially, is sarcastic with an irritable edge  She continues to exhibit ongoing symptoms of mood lability, low frustration tolerance and is easily irritable without provocation  Does remain bizarre, illogical and disorganized in her thought process  Per chart review, patient was on previous regimen of Adderall and Wellbutrin  PDMP shows was previously prescribed a 30 day supply of Adderall on 04/12/2022  When questioned patient about this she stated that her Adderall and Wellbutrin were previously stopped because it made her, paranoid    However later went on to say that, but I was always taking it  It was probably some poisoning or maybe my contacts    Otherwise she is denying any thoughts to harm herself  Does not appear preoccupied      Sleep: decreased, slept off and on  Appetite: normal  Medication side effects: No   ROS: no complaints, all other systems are negative    Mental Status Evaluation:    Appearance:  Dressed in regular attire, her self-care   Behavior:  More cooperative, remains bizarre   Speech:  Normal rate and volume   Mood:  Depressed from being here   Affect:  labile, increased in intensity   Thought Process:  disorganized, illogical   Associations: Circumstantial   Thought Content:  paranoid ideation   Perceptual Disturbances: none   Risk Potential: Suicidal ideation - None at present  Homicidal ideation - None at present  Potential for aggression - No   Sensorium:  oriented to person and place   Memory:  recent and remote memory: unable to assess due to lack of cooperation   Consciousness:  alert and awake   Attention/Concentration: attention span and concentration appear shorter than expected for age   Insight:  poor   Judgment: poor   Gait/Station: normal gait/station   Motor Activity: no abnormal movements       Vital signs in last 24 hours:    Temp:  [98 2 °F (36 8 °C)-98 6 °F (37 °C)] 98 2 °F (36 8 °C)  HR:  [64-78] 78  Resp:  [18] 18  BP: (141-148)/(77-98) 148/98    Laboratory results: I have personally reviewed all pertinent laboratory/tests results    Results from the past 24 hours: No results found for this or any previous visit (from the past 24 hour(s))  Most Recent Labs:   Lab Results   Component Value Date    WBC 10 31 (H) 05/28/2022    RBC 4 87 05/28/2022    HGB 14 9 05/28/2022    HCT 45 9 05/28/2022     (H) 05/28/2022    RDW 12 7 05/28/2022    NEUTROABS 6 44 05/28/2022    SODIUM 139 05/28/2022    K 4 0 05/28/2022     05/28/2022    CO2 32 05/28/2022    BUN 15 05/28/2022    CREATININE 0 77 05/28/2022    GLUC 79 05/28/2022    CALCIUM 9 8 05/28/2022    AST 15 05/28/2022    ALT 28 05/28/2022    ALKPHOS 64 05/28/2022    TP 7 4 05/28/2022    ALB 4 7 05/28/2022    TBILI 0 73 05/28/2022    CHOLESTEROL 136 05/28/2022    HDL 37 05/28/2022    TRIG 92 05/28/2022    LDLCALC 81 05/28/2022    NONHDLC 99 05/28/2022    [id number] 1 244 05/28/2022    PREGUR negative 05/23/2022       Progress Toward Goals: Unchanged    Assessment/Plan   Principal Problem:    Psychosis ([address] )  Active Problems:    Autism      Recommended Treatment:     Planned medication and treatment changes:     All current active medications have been reviewed  Encourage group therapy, milieu therapy and occupational therapy  Behavioral Health checks every 7 minutes  Continue current medications and therapy - Zyprexa just recently increased today  Discharge disposition remains ongoing    Current Facility-Administered Medications   Medication Dose Route Frequency Provider Last Rate    acetaminophen  650 mg Oral Q6H PRN Everlina Chowdhury Medei, CRNP      acetaminophen  650 mg Oral Q4H PRN Everlina Chowdhury Medei, CRNP      acetaminophen  975 mg Oral Q6H PRN Everlina Chowdhury Medei, CRNP      aluminum-magnesium hydroxide-simethicone  30 mL Oral Q4H PRN Everlina Chowdhury Medei, CRNP      haloperidol lactate  2 5 mg Intramuscular Q6H PRN Max 4/day Everlina Chowdhury Medei, CRNP      And    LORazepam  1 mg Intramuscular Q6H PRN Max 4/day Everlina Chowdhury Medei, CRNP      And    benztropine  0 5 mg Intramuscular Q6H PRN Max 4/day Everlina Chowdhury Medei, CRNP      haloperidol lactate  5 mg Intramuscular Q4H PRN Max 4/day Everlina Chowdhury Medei, CRNP      And    LORazepam  2 mg Intramuscular Q4H PRN Max 4/day Everlina Chowdhury Medei, CRNP      And    benztropine  1 mg Intramuscular Q4H PRN Max 4/day Everlina Chowdhury Medei, CRNP      benztropine  1 mg Oral Q6H PRN Everlina Chowdhury Medei, CRNP      Diclofenac Sodium  2 g Topical 4x Daily PRN Mirella Jauregui PA-C      escitalopram  10 mg Oral Daily Christiane M Medei, CRNP      glycerin-hypromellose-  1 drop Both Eyes Q3H PRN Mirella Jauregui PA-C      haloperidol  2 mg Oral Q4H PRN Max 6/day Christiane M Medei, CRNP      haloperidol  5 mg Oral Q6H PRN Max 4/day Everlina Chowdhury Medei, CRNP      haloperidol  5 mg Oral Q4H PRN Max 4/day Everlina Chowdhury Medei, CRNP      hydrOXYzine HCL  25 mg Oral Q6H PRN Max 4/day Everlina Chowdhury Medei, CRNP      hydrOXYzine HCL  50 mg Oral Q4H PRN Max 4/day Everlina Chowdhury Medei, CRNP      Or    LORazepam  1 mg Intramuscular Q4H PRN Everlina Chowdhury Medei, CRNP      lactase  6,000 Units Oral TID PRN Mirella Jauregui PA-C      LORazepam  1 mg Oral Q6H PRN WHIT Rob      Or    LORazepam  2 mg Intramuscular Q6H PRN Max 3/day WHIT Rob      melatonin  3 mg Oral HS Jose Cons, MD      nicotine polacrilex  2 mg Oral Q2H PRN Teddy HuguWHIT Albarran      OLANZapine  10 mg Oral Daily [place], [place]      polyethylene glycol  17 g Oral Daily PRN Teddy Huguenin WHIT [place]       Risks / Benefits of Treatment:    Risks, benefits, and possible side effects of medications explained to patient and patient verbalizes understanding and agreement for treatment  Counseling / Coordination of Care: Total floor / unit time spent today 20 minutes  Greater than 50% of total time was spent with the patient and / or family counseling and / or coordination of care  A description of counseling / coordination of care:  Patient's progress discussed with staff in treatment team meeting  Medications, treatment progress and treatment plan reviewed with patient      Hoa Raymond PA-C 05/29/22

## 2022-05-29 NOTE — PROGRESS NOTES
Patient had belongings dropped off today   This writer inventoried the belongings that contained the following items:    Patient room    Pants x3  Underwear x3  Shirts x3  Socks x3    Hygiene bin    , glasses x2 and mis-matched shoes, etc

## 2022-05-29 NOTE — NURSING NOTE
#[id number] Pharmacy Bag Sent on 5/29/22 LILLIAN Rodriguez RN count 8 blue capsules 1 white round pill 1 prescription bottle 0 refill

## 2022-05-30 PROCEDURE — 99232 SBSQ HOSP IP/OBS MODERATE 35: CPT | Performed by: PSYCHIATRY & NEUROLOGY

## 2022-05-30 RX ADMIN — ALUMINUM HYDROXIDE, MAGNESIUM HYDROXIDE, AND SIMETHICONE 30 ML: 200; 200; 20 SUSPENSION ORAL at 06:51

## 2022-05-30 RX ADMIN — GLYCERIN, HYPROMELLOSE, POLYETHYLENE GLYCOL 1 DROP: .2; .2; 1 LIQUID OPHTHALMIC at 20:01

## 2022-05-30 RX ADMIN — NICOTINE POLACRILEX 2 MG: 2 GUM, CHEWING BUCCAL at 00:03

## 2022-05-30 RX ADMIN — ESCITALOPRAM OXALATE 10 MG: 10 TABLET ORAL at 08:45

## 2022-05-30 RX ADMIN — NICOTINE POLACRILEX 2 MG: 2 GUM, CHEWING BUCCAL at 08:48

## 2022-05-30 RX ADMIN — NICOTINE POLACRILEX 2 MG: 2 GUM, CHEWING BUCCAL at 12:51

## 2022-05-30 RX ADMIN — GLYCERIN, HYPROMELLOSE, POLYETHYLENE GLYCOL 1 DROP: .2; .2; 1 LIQUID OPHTHALMIC at 13:34

## 2022-05-30 RX ADMIN — GLYCERIN, HYPROMELLOSE, POLYETHYLENE GLYCOL 1 DROP: .2; .2; 1 LIQUID OPHTHALMIC at 16:15

## 2022-05-30 RX ADMIN — NICOTINE POLACRILEX 2 MG: 2 GUM, CHEWING BUCCAL at 16:16

## 2022-05-30 RX ADMIN — NICOTINE POLACRILEX 2 MG: 2 GUM, CHEWING BUCCAL at 06:51

## 2022-05-30 RX ADMIN — NICOTINE POLACRILEX 2 MG: 2 GUM, CHEWING BUCCAL at 20:02

## 2022-05-30 RX ADMIN — Medication 3 MG: at 21:48

## 2022-05-30 RX ADMIN — OLANZAPINE 10 MG: 10 TABLET, ORALLY DISINTEGRATING ORAL at 08:45

## 2022-05-30 RX ADMIN — Medication 3 MG: at 00:03

## 2022-05-30 RX ADMIN — GLYCERIN, HYPROMELLOSE, POLYETHYLENE GLYCOL 1 DROP: .2; .2; 1 LIQUID OPHTHALMIC at 06:51

## 2022-05-30 NOTE — PROGRESS NOTES
[id number] 29 [facility]  [facility] MRN: [id number]   Unit/Bed#: -01 Encounter: [id number]    Behavior over the last 24 hours:  Minimal improvement    Juliana was seen in follow-up for continuation of care  Per report, patient was somatically preoccupied throughout the evening claiming she was catatonic despite moving restlessly in bed  Otherwise at a periods of time where she was out in visible in the community and social with peers  No PRNs utilized overnight  Patient was seen at bedside  They is dressed in attire from yesterday and appears generally kempt  They continues to exhibit periods of labile, irritable mood and low frustration tolerance  Remains disorganized and illogical in conversation  Is making claims that keeping here is making them, catatonic and they needs access stimulation to continue to live  Continues to remain somewhat argumentative demanding discharged because, you're only keeping me here for your benefit and your benefit only    Otherwise she is denying SI/HI/AVH  Patient later had come to writer's office and wanted to rediscussed her medications  They was questioning if her Adderall ER could be added however when questioned if they were compliant with this at home they were unable to give a specific answer  PDMP does show that patient was taking Adderall ER 20 mg p o  Daily  However at this time, will hold off on restarting it since patient does exhibit some ongoing paranoia and suspiciousness possible underlying psychotic behavior  They did state to writer yesterday that these medications including Wellbutrin previously caused her worsening paranoia  She later went on to state that she was previously, doing well since her previous relapse of bipolar disorder when she was 18  However states that most of her symptoms are due to autism       Sleep: difficulty falling asleep  Appetite: normal  Medication side effects: No   ROS: no complaints, all other systems are negative    Mental Status Evaluation:    Appearance:  Dressed in regular attire, fair self-care   Behavior:  Argumentative and demanding at times   Speech:  Normal rate and volume   Mood:  Not good   Affect:  labile, increased intensity   Thought Process:  disorganized, illogical   Associations: Circumstantial and tangential   Thought Content:  paranoid ideation   Perceptual Disturbances: none   Risk Potential: Suicidal ideation - None at present  Homicidal ideation - None at present  Potential for aggression - No   Sensorium:  oriented to person and place   Memory:  recent and remote memory: unable to assess due to lack of cooperation   Consciousness:  alert and awake   Attention/Concentration: attention span and concentration appear shorter than expected for age   Insight:  poor   Judgment: poor   Gait/Station: normal gait/station   Motor Activity: no abnormal movements       Vital signs in last 24 hours:    Temp:  [98 2 °F (36 8 °C)-98 5 °F (36 9 °C)] 98 2 °F (36 8 °C)  HR:  [67-91] 91  Resp:  [18] 18  BP: (114-133)/(71-84) 133/71    Laboratory results: I have personally reviewed all pertinent laboratory/tests results    Results from the past 24 hours: No results found for this or any previous visit (from the past 24 hour(s))    Most Recent Labs:   Lab Results   Component Value Date    WBC 10 31 (H) 05/28/2022    RBC 4 87 05/28/2022    HGB 14 9 05/28/2022    HCT 45 9 05/28/2022     (H) 05/28/2022    RDW 12 7 05/28/2022    NEUTROABS 6 44 05/28/2022    SODIUM 139 05/28/2022    K 4 0 05/28/2022     05/28/2022    CO2 32 05/28/2022    BUN 15 05/28/2022    CREATININE 0 77 05/28/2022    GLUC 79 05/28/2022    CALCIUM 9 8 05/28/2022    AST 15 05/28/2022    ALT 28 05/28/2022    ALKPHOS 64 05/28/2022    TP 7 4 05/28/2022    ALB 4 7 05/28/2022    TBILI 0 73 05/28/2022    CHOLESTEROL 136 05/28/2022    HDL 37 05/28/2022    TRIG 92 05/28/2022    LDLCALC 81 05/28/2022    NONHDLC 99 05/28/2022    [id number] 1 244 05/28/2022    PREGUR negative 05/23/2022       Progress Toward Goals: Minimal improvement    Assessment/Plan   Principal Problem:    Psychosis ([address] )  Active Problems:    Autism      Recommended Treatment:     Planned medication and treatment changes:     All current active medications have been reviewed  Encourage group therapy, milieu therapy and occupational therapy  Behavioral Health checks every 7 minutes  Continue current medications and therapy  Discharge disposition remains ongoing    Current Facility-Administered Medications   Medication Dose Route Frequency Provider Last Rate    acetaminophen  650 mg Oral Q6H PRN Law Marija Medei, CRNP      acetaminophen  650 mg Oral Q4H PRN Law Marija Medei, CRNP      acetaminophen  975 mg Oral Q6H PRN Law Marija Medei, CRNP      aluminum-magnesium hydroxide-simethicone  30 mL Oral Q4H PRN Law Marija Medei, CRNP      haloperidol lactate  2 5 mg Intramuscular Q6H PRN Max 4/day Law Marija Medei, CRNP      And    LORazepam  1 mg Intramuscular Q6H PRN Max 4/day Law Marija Medei, CRNP      And    benztropine  0 5 mg Intramuscular Q6H PRN Max 4/day Law Marija Medei, CRNP      haloperidol lactate  5 mg Intramuscular Q4H PRN Max 4/day Law Marija Medei, CRNP      And    LORazepam  2 mg Intramuscular Q4H PRN Max 4/day Law Marija Medei, CRNP      And    benztropine  1 mg Intramuscular Q4H PRN Max 4/day Law Marija Medei, CRNP      benztropine  1 mg Oral Q6H PRN Law Marija Medei, CRNP      Diclofenac Sodium  2 g Topical 4x Daily PRN Mirella Jauregui PA-C      escitalopram  10 mg Oral Daily Christiane M Medei, CRNP      glycerin-hypromellose-  1 drop Both Eyes Q3H PRN Mirella Jauregui PA-C      haloperidol  2 mg Oral Q4H PRN Max 6/day Christiane M Medei, CRNP      haloperidol  5 mg Oral Q6H PRN Max 4/day Law Marija Medei, CRNP      haloperidol  5 mg Oral Q4H PRN Max 4/day Law Marija Medei, CRNP      hydrOXYzine HCL  25 mg Oral Q6H PRN Max 4/day [address], CRNP      hydrOXYzine HCL  50 mg Oral Q4H PRN Max 4/day Zelphia Muller Medei, CRNP      Or    LORazepam  1 mg Intramuscular Q4H PRN Zelphia Muller Medei, CRNP      lactase  6,000 Units Oral TID PRN Mirella Jauregui PA-C      LORazepam  1 mg Oral Q6H PRN Zelphia Muller Medei, CRNP      Or    LORazepam  2 mg Intramuscular Q6H PRN Max 3/day Zelphia Muller Medei, CRNP      melatonin  3 mg Oral HS Patria Gooden MD      nicotine polacrilex  2 mg Oral Q2H PRN Zelphia Muller Medei, CRNP      OLANZapine  10 mg Oral Daily Giovanny Gutierrez PA-C      polyethylene glycol  17 g Oral Daily PRN Zelphia Muller Medei, CRNP       Risks / Benefits of Treatment:    Risks, benefits, and possible side effects of medications explained to patient and patient verbalizes understanding and agreement for treatment  Counseling / Coordination of Care: Total floor / unit time spent today 20 minutes  Greater than 50% of total time was spent with the patient and / or family counseling and / or coordination of care  A description of counseling / coordination of care:  Patient's progress discussed with staff in treatment team meeting  Medications, treatment progress and treatment plan reviewed with patient      Yousif Sotelo PA-C 05/30/22

## 2022-05-30 NOTE — PROGRESS NOTES
Progress Note - Deepak Garland 29 y o  adult MRN: [id number]    Unit/Bed#: [facility] 251-01 Encounter: [id number]        Subjective:   Patient seen and examined at bedside after reviewing the chart and discussing the case with the caring staff  Patient examined at bedside  Patient has no acute complaints  Patient's labs from 05/30/2022 showed vitamin B12 at 512 pg/mL, vitamin-D at 49 0 ng/mL, folate 18 0 ng/mL  Physical Exam   Vitals: Blood pressure 133/71, pulse 91, temperature 98 2 °F (36 8 °C), temperature source Temporal, resp  rate 18, height 5' 3" (1 6 m), weight 67 1 kg (148 lb), last menstrual period 05/23/2022, SpO2 95 %  ,Body mass index is 26 22 kg/m²  Constitutional: Patient appears in no acute distress  HEENT: PERR, EOMI, MMM  Cardiovascular: Normal rate and regular rhythm  Pulmonary/Chest: Effort normal and breath sounds normal    Abdomen:  Nondistended  Assessment/Plan:  Chester Cohn is a(n) 29 y o  adult with psychosis, autism      1  Arthralgias/headache  Patient may take Tylenol as needed, Voltaren gel to use on as-needed basis  2  Dry eyes  Patient may use artificial tears as needed  3  Tobacco use  Nicorette gum as needed  4  Insomnia  Melatonin at bedtime, Trazodone as needed  The patient was discussed with Dr Sumit Bajwa and he is in agreement with the above note

## 2022-05-30 NOTE — NURSING NOTE
Patient observed sleeping through the majority of the night per rounds without any obvious distress , respirations were even

## 2022-05-30 NOTE — NURSING NOTE
Patient awakened was asleep early on, request nicorette gum, scheduled/missed melatonin , a beverage and snack  Returned to bedroom

## 2022-05-30 NOTE — PLAN OF CARE
Problem: PAIN - ADULT  Goal: Verbalizes/displays adequate comfort level or baseline comfort level  Description: Interventions:  - Encourage patient to monitor pain and request assistance  - Assess pain using appropriate pain scale  - Administer analgesics based on type and severity of pain and evaluate response  - Implement non-pharmacological measures as appropriate and evaluate response  - Consider cultural and social influences on pain and pain management  - Notify physician/advanced practitioner if interventions unsuccessful or patient reports new pain  Outcome: Progressing     Problem: SAFETY ADULT  Goal: Patient will remain free of falls  Description: INTERVENTIONS:  - Educate patient/family on patient safety including physical limitations  - Instruct patient to call for assistance with activity   - Consult OT/PT to assist with strengthening/mobility   - Keep Call bell within reach  - Keep bed low and locked with side rails adjusted as appropriate  - Keep care items and personal belongings within reach  - Initiate and maintain comfort rounds  - Make Fall Risk Sign visible to staff  - Offer Toileting every  Hours, in advance of need  - Initiate/Maintain alarm  - Obtain necessary fall risk management equipment:   - Apply yellow socks and bracelet for high fall risk patients  - Consider moving patient to room near nurses station  Outcome: Progressing  Goal: Maintain or return to baseline ADL function  Description: INTERVENTIONS:  -  Assess patient's ability to carry out ADLs; assess patient's baseline for ADL function and identify physical deficits which impact ability to perform ADLs (bathing, care of mouth/teeth, toileting, grooming, dressing, etc )  - Assess/evaluate cause of self-care deficits   - Assess range of motion  - Assess patient's mobility; develop plan if impaired  - Assess patient's need for assistive devices and provide as appropriate  - Encourage maximum independence but intervene and supervise when necessary  - Involve family in performance of ADLs  - Assess for home care needs following discharge   - Consider OT consult to assist with ADL evaluation and planning for discharge  - Provide patient education as appropriate  Outcome: Progressing  Goal: Maintains/Returns to pre admission functional level  Description: INTERVENTIONS:  - Perform BMAT or MOVE assessment daily    - Set and communicate daily mobility goal to care team and patient/family/caregiver  - Collaborate with rehabilitation services on mobility goals if consulted  - Perform Range of Motion  times a day  - Reposition patient every  hours    - Dangle patient  times a day  - Stand patient  times a day  - Ambulate patient  times a day  - Out of bed to chair times a day   - Out of bed for meals  times a day  - Out of bed for toileting  - Record patient progress and toleration of activity level   Outcome: Progressing     Problem: Ineffective Coping  Goal: Cooperates with admission process  Description: Interventions:   - Complete admission process  Outcome: Completed

## 2022-05-30 NOTE — NURSING NOTE
Pt  Is alert and oriented X4  Pt  Noted to have affective instability with significant mood changes through out day  At times pt  Very quiet, subdued, minimal conversation, isolative to room, says they "forget things ' Other times pt  Noted to be social with peers, pleasant, cooperative, bright affect  Denies AH VH

## 2022-05-31 PROCEDURE — 99232 SBSQ HOSP IP/OBS MODERATE 35: CPT | Performed by: NURSE PRACTITIONER

## 2022-05-31 RX ADMIN — ESCITALOPRAM OXALATE 10 MG: 10 TABLET ORAL at 09:44

## 2022-05-31 RX ADMIN — NICOTINE POLACRILEX 2 MG: 2 GUM, CHEWING BUCCAL at 05:49

## 2022-05-31 RX ADMIN — NICOTINE POLACRILEX 2 MG: 2 GUM, CHEWING BUCCAL at 20:39

## 2022-05-31 RX ADMIN — ALUMINUM HYDROXIDE, MAGNESIUM HYDROXIDE, AND SIMETHICONE 30 ML: 200; 200; 20 SUSPENSION ORAL at 22:37

## 2022-05-31 RX ADMIN — GLYCERIN, HYPROMELLOSE, POLYETHYLENE GLYCOL 1 DROP: .2; .2; 1 LIQUID OPHTHALMIC at 13:43

## 2022-05-31 RX ADMIN — NICOTINE POLACRILEX 2 MG: 2 GUM, CHEWING BUCCAL at 09:43

## 2022-05-31 RX ADMIN — NICOTINE POLACRILEX 2 MG: 2 GUM, CHEWING BUCCAL at 18:27

## 2022-05-31 RX ADMIN — Medication 3 MG: at 20:39

## 2022-05-31 RX ADMIN — OLANZAPINE 10 MG: 10 TABLET, ORALLY DISINTEGRATING ORAL at 09:44

## 2022-05-31 RX ADMIN — NICOTINE POLACRILEX 2 MG: 2 GUM, CHEWING BUCCAL at 15:00

## 2022-05-31 RX ADMIN — GLYCERIN, HYPROMELLOSE, POLYETHYLENE GLYCOL 1 DROP: .2; .2; 1 LIQUID OPHTHALMIC at 05:48

## 2022-05-31 NOTE — PROGRESS NOTES
05/31/22 0407   Activity/Group Checklist   Group   (Community Meeting and Check-In)   Attendance Attended   Attendance Duration (min) 46-60   Interactions Interacted appropriately   Affect/Mood Appropriate   Goals Achieved Identified feelings; Discussed coping strategies; Able to listen to others; Able to engage in interactions; Able to reflect/comment on own behavior

## 2022-05-31 NOTE — SOCIAL WORK
Daniel called to make a new pt intake appt through [facility] ([phone]) for counseling/med management  Lyssa could not verify insurance and will send it to their billing dept  Tana Connor will be calling DANIEL back once they find the pt's insurance plan in their system  SW to follow up at a later time if no response is given today  DANIEL spoke to Willy Chaudhari about the adult program, but they are not admitting adults at this time   ([phone])

## 2022-05-31 NOTE — NURSING NOTE
Patient returned with a request to shower  Staff provided toiletry bag and ledgered some of the patients personal shampoo and conditioner to they also for they/thems shower

## 2022-05-31 NOTE — NURSING NOTE
Giorgi was visible on the unit  They had no concerns  Request eye drops and nicorette gum  Patient expressed felling better today after getting enough sleep the previous night  Pt  Observed creating and  engaged in her arts and crafts, patient stationed herself near the nurses station by the  window along with her art supplies  Patient stated " art is therapeutic for me " , social with staff and peers, was engaged in appropriate with conversations   No acute behaviors  made all needs known appropriately      Appropriate affect, generally pleasant , no bizarre or illogical thinking/ behavior ,  patient controlled and overall cooperative   Giorgi  denies suicidal ideation, depression, denies anxiety  A/V/H denied/not observed  no obvious distress or pain noted      Eyes drops and nicorette gum administered per patient request Patient had an adequate fluid intake with her evening snack without difficulty, Q 7 minute checks maintained  Staff maintains support

## 2022-05-31 NOTE — NURSING NOTE
Patient observed sleeping through the majority of  the night per rounds  Patient observed with broken sleep , approached nurses station close to 0300 for ice water , walked the buchanan briefly and returned to bed  Shortly after this patient returned wanting a shower, patient assured that they could shower at a more appropriate time/pt receptive  There was no obvious distress with even respirations while asleep

## 2022-05-31 NOTE — PROGRESS NOTES
Progress Note - Dimple 24 L Meder 29 y o  adult MRN: [id number]  Unit/Bed#: U 251-01 Encounter: [id number]    Assessment/Plan   Principal Problem:    Psychosis ([address] )  Active Problems:    Autism      Behavior over the last 24 hours:  unchanged  Sleep: improved  Appetite: normal  Medication side effects: No  ROS: no complaints and all other systems are negative    Giorgi was seen today for psychiatric follow-up  They reported an improvement with sleep, energy, and appetite  Hygiene and self care also improved  Denied AVH/SI/HI  They were able to hold a linear and direct conversation with this provider  Giorgi spoke about how they feel this current hospital admission is doing more harm to me at this point  I need to be able to get out and not be caged in  It's not good for my autism  I need a routine and this break in my routine is detrimental to me " They went on to speak about how the more I talk, the more anxious I get  I get to a point where I become catatonic because talking is a stressor for me and I check out  It's not good if I end up on the floor " Denies any depressive or anxiety symptoms  Throughout conversation, they verbalized no delusional content and thoughts perseverative on discharge and diagnosis of autism  Argumentative at times about their autistic needs and "disabilities" not being met, although unable to provide specifics  Patient is often visible in the milieu, social with select peers, and often doing art activities  Has been compliant with medications and meals  Toward end of conversation, Freedoman inisisted adderall be restarted "to be able to focus " Medication education was provided regarding current regimen and risks versus benefits of Adderall  At no point during encounter, did patient exhibit episodes of catatonia or difficulty verbally expressing their needs      Mental Status Evaluation:  Appearance:  age appropriate, casually dressed and Short dark hair, well groomed, glasses, carrying notebook   Behavior:  Cooperative, calm, argumentative at times, redirectable   Speech:  normal pitch, normal volume and tangential, fluent   Mood:  "frustrated"   Affect:  mood-congruent, redirectable and Irritable edge   Thought Process:  tangential and Linear, perseverative   Thought Content:  Less paranoid   Perceptual Disturbances: Denies AVH, did not appear internally preoccupied   Risk Potential: Suicidal Ideations none  Homicidal Ideations none  Potential for Aggression No   Sensorium:  person, place, time/date and situation   Memory:  recent and remote memory grossly intact   Consciousness:  alert and awake    Attention: attention span appeared shorter than expected for age   Insight:  limited   Judgment: limited   Gait/Station: normal gait/station and normal balance   Motor Activity: no abnormal movements     Progress Toward Goals:  Sleep, appetite, energy, and self-care improving  Able to make needs known  Less paranoid  Perseverative on discharge and diagnosis of autism  Compliant with medications and visible in milieu  Will continue with current psychotropic regimen; patient tolerating well  Will return home upon stabilization  No discharge date at this time  Recommended Treatment: Continue with group therapy, milieu therapy and occupational therapy  Risks, benefits and possible side effects of Medications:   Freedoman has limited understanding of risks versus benefits of medications, but agrees to take as prescribed      Medications:   all current active meds have been reviewed, continue current psychiatric medications and current meds:   Current Facility-Administered Medications   Medication Dose Route Frequency    acetaminophen (TYLENOL) tablet 650 mg  650 mg Oral Q6H PRN    acetaminophen (TYLENOL) tablet 650 mg  650 mg Oral Q4H PRN    acetaminophen (TYLENOL) tablet 975 mg  975 mg Oral Q6H PRN    aluminum-magnesium hydroxide-simethicone (MYLANTA) oral suspension 30 mL  30 mL Oral Q4H PRN    haloperidol lactate (HALDOL) injection 2 5 mg  2 5 mg Intramuscular Q6H PRN Max 4/day    And    LORazepam (ATIVAN) injection 1 mg  1 mg Intramuscular Q6H PRN Max 4/day    And    benztropine (COGENTIN) injection 0 5 mg  0 5 mg Intramuscular Q6H PRN Max 4/day    haloperidol lactate (HALDOL) injection 5 mg  5 mg Intramuscular Q4H PRN Max 4/day    And    LORazepam (ATIVAN) injection 2 mg  2 mg Intramuscular Q4H PRN Max 4/day    And    benztropine (COGENTIN) injection 1 mg  1 mg Intramuscular Q4H PRN Max 4/day    benztropine (COGENTIN) tablet 1 mg  1 mg Oral Q6H PRN    Diclofenac Sodium (VOLTAREN) 1 % topical gel 2 g  2 g Topical 4x Daily PRN    escitalopram (LEXAPRO) tablet 10 mg  10 mg Oral Daily    glycerin-hypromellose- (ARTIFICIAL TEARS) ophthalmic solution 1 drop  1 drop Both Eyes Q3H PRN    haloperidol (HALDOL) tablet 2 mg  2 mg Oral Q4H PRN Max 6/day    haloperidol (HALDOL) tablet 5 mg  5 mg Oral Q6H PRN Max 4/day    haloperidol (HALDOL) tablet 5 mg  5 mg Oral Q4H PRN Max 4/day    hydrOXYzine HCL (ATARAX) tablet 25 mg  25 mg Oral Q6H PRN Max 4/day    hydrOXYzine HCL (ATARAX) tablet 50 mg  50 mg Oral Q4H PRN Max 4/day    Or    LORazepam (ATIVAN) injection 1 mg  1 mg Intramuscular Q4H PRN    lactase (LACTAID) tablet 6,000 Units  6,000 Units Oral TID PRN    LORazepam (ATIVAN) tablet 1 mg  1 mg Oral Q6H PRN    Or    LORazepam (ATIVAN) injection 2 mg  2 mg Intramuscular Q6H PRN Max 3/day    melatonin tablet 3 mg  3 mg Oral HS    nicotine polacrilex (NICORETTE) gum 2 mg  2 mg Oral Q2H PRN    OLANZapine (ZyPREXA ZYDIS) dispersible tablet 10 mg  10 mg Oral Daily    polyethylene glycol (MIRALAX) packet 17 g  17 g Oral Daily PRN     Labs: I have personally reviewed all pertinent laboratory/tests results  Counseling / Coordination of Care  Total floor / unit time spent today 30 minutes   Greater than 50% of total time was spent with the patient and / or family counseling and / or coordination of care   A description of the counseling / coordination of care:  Medication education, treatment plan, supportive therapy, safety planning

## 2022-05-31 NOTE — PROGRESS NOTES
05/31/22    Team Members Present   Team Members Present Physician;Nurse;   Physician Team Member Dr Royce Staley MD, [facility] DR KERRY AGARWAL [place]   Nursing Team Member Royal Peg RN   Social Work Team Member Aaron Fay   Patient/Family Present   Patient Present No   Patient's Family Present No      Mood shifts, act catatonic at times, carries all art supplies throughout the day, increased insight, converses appropriately, denies all, disturbed sleep

## 2022-05-31 NOTE — NURSING NOTE
Pt is AAOX4, calm, cooperative, and med compliant  Denies all SI, HI, AVH, depression and anxiety  Also denies thoughts of self harm  Pt was pleasant and med compliant this shift  Social with peers and visible on unit

## 2022-05-31 NOTE — PLAN OF CARE
Problem: Alteration in Thoughts and Perception  Goal: Verbalize thoughts and feelings  Description: Interventions:  - Promote a nonjudgmental and trusting relationship with the patient through active listening and therapeutic communication  - Assess patient's level of functioning, behavior and potential for risk  - Engage patient in 1 on 1 interactions  - Encourage patient to express fears, feelings, frustrations, and discuss symptoms    - [place] patient to reality, help patient recognize reality-based thinking   - Administer medications as ordered and assess for potential side effects  - Provide the patient education related to the signs and symptoms of the illness and desired effects of prescribed medications  Outcome: Progressing  Goal: Refrain from acting on delusional thinking/internal stimuli  Description: Interventions:  - Monitor patient closely, per order   - Utilize least restrictive measures   - Set reasonable limits, give positive feedback for acceptable   - Administer medications as ordered and monitor of potential side effects  Outcome: Progressing  Goal: Agree to be compliant with medication regime, as prescribed and report medication side effects  Description: Interventions:  - Offer appropriate PRN medication and supervise ingestion; conduct AIMS, as needed   Outcome: Progressing  Goal: Attend and participate in unit activities, including therapeutic, recreational, and educational groups  Description: Interventions:  -Encourage Visitation and family involvement in care  Outcome: Progressing  Goal: Recognize dysfunctional thoughts, communicate reality-based thoughts at the time of discharge  Description: Interventions:  - Provide medication and psycho-education to assist patient in compliance and developing insight into his/her illness   Outcome: Progressing

## 2022-05-31 NOTE — PROGRESS NOTES
05/31/22 1000   Activity/Group Checklist   Group   ([employer] for [employer])   Attendance Attended   Attendance Duration (min) Greater than 60   Interactions Interacted appropriately   Affect/Mood Appropriate;Blunted/flat; Angry   Goals Achieved Able to listen to others; Able to engage in interactions

## 2022-05-31 NOTE — PROGRESS NOTES
Progress Note - Melanie Garland 29 y o  adult MRN: [id number]    Unit/Bed#: [facility] 251-01 Encounter: [id number]        Subjective:   Patient seen and examined at bedside after reviewing the chart and discussing the case with the caring staff  Patient examined at bedside  Patient has no acute complaints  Patient is requesting that if she can get her Rubi[employer] Cube to play with her history of autism spectrum disorder  Physical Exam   Vitals: Blood pressure 142/71, pulse 61, temperature 98 4 °F (36 9 °C), temperature source Temporal, resp  rate 16, height 5' 3" (1 6 m), weight 67 1 kg (148 lb), last menstrual period 05/23/2022, SpO2 97 %  ,Body mass index is 26 22 kg/m²  Constitutional: Patient appears in no acute distress  HEENT: PERR, EOMI, MMM  Cardiovascular: Normal rate and regular rhythm  Pulmonary/Chest: Effort normal and breath sounds normal    Abdomen:  Nondistended  Assessment/Plan:  Jena Barclay is a(n) 29 y o  adult with psychosis, autism      1  Arthralgias/headache  Patient may take Tylenol as needed, Voltaren gel to use on as-needed basis  2  Dry eyes  Patient may use artificial tears as needed  3  Tobacco use  Nicorette gum as needed  4  Insomnia  Melatonin at bedtime, Trazodone as needed

## 2022-05-31 NOTE — SOCIAL WORK
SW met with pt for check-in  Pt reports anx 2/10, dep 0/10, meal/med comp, slept, attends groups, and being social with peers  Pt is using journaling and art for coping skills  Pt appeared alert, well groomed, and focused  Sw gave pt phone number for their girlfriend, who left vm for SW

## 2022-06-01 PROCEDURE — 99232 SBSQ HOSP IP/OBS MODERATE 35: CPT | Performed by: HOSPITALIST

## 2022-06-01 RX ORDER — DIPHENHYDRAMINE HCL 25 MG
25 TABLET ORAL EVERY 6 HOURS PRN
Status: DISCONTINUED | OUTPATIENT
Start: 2022-06-01 | End: 2022-06-06 | Stop reason: HOSPADM

## 2022-06-01 RX ADMIN — GLYCERIN, HYPROMELLOSE, POLYETHYLENE GLYCOL 1 DROP: .2; .2; 1 LIQUID OPHTHALMIC at 07:35

## 2022-06-01 RX ADMIN — OLANZAPINE 10 MG: 10 TABLET, ORALLY DISINTEGRATING ORAL at 08:33

## 2022-06-01 RX ADMIN — DIPHENHYDRAMINE HCL 25 MG: 25 TABLET ORAL at 08:33

## 2022-06-01 RX ADMIN — ACETAMINOPHEN 975 MG: 325 TABLET ORAL at 10:31

## 2022-06-01 RX ADMIN — ESCITALOPRAM OXALATE 10 MG: 10 TABLET ORAL at 08:33

## 2022-06-01 RX ADMIN — DIPHENHYDRAMINE HCL 25 MG: 25 TABLET ORAL at 20:11

## 2022-06-01 RX ADMIN — NICOTINE POLACRILEX 2 MG: 2 GUM, CHEWING BUCCAL at 10:33

## 2022-06-01 RX ADMIN — Medication 3 MG: at 20:55

## 2022-06-01 RX ADMIN — NICOTINE POLACRILEX 2 MG: 2 GUM, CHEWING BUCCAL at 07:36

## 2022-06-01 RX ADMIN — NICOTINE POLACRILEX 2 MG: 2 GUM, CHEWING BUCCAL at 14:07

## 2022-06-01 RX ADMIN — NICOTINE POLACRILEX 2 MG: 2 GUM, CHEWING BUCCAL at 16:05

## 2022-06-01 NOTE — SOCIAL WORK
DANIEL spoke with pt, pt mother and brother regarding DC ([phone])  Pt family requests [facility] and [place] referrals  SW communicated DC plans and pt medication  Pt agreeable to return to [facility]  SW is agreeable to [facility] and [address] [place] [address] 43 Case management services  Pt is agreeable to stay on unit until Monday

## 2022-06-01 NOTE — PROGRESS NOTES
Progress Note - Primitivo Garland 29 y o  adult MRN: [id number]    Unit/Bed#: [facility] 251-01 Encounter: [id number]        Subjective:   Patient seen and examined at bedside after reviewing the chart and discussing the case with the caring staff  Patient examined at bedside  Patient this morning complained of allergic reaction to her pancake/wobbles as she reported she has allergy to gluten and most likely that food was gluten free  Patient was treated with Benadryl 25 mg x 1 orally  Patient continues to be distressed for her [employer]  Patient is a possible discharge for Friday 06/03/2022  Physical Exam   Vitals: Blood pressure 149/68, pulse 75, temperature 98 4 °F (36 9 °C), temperature source Temporal, resp  rate 16, height 5' 3" (1 6 m), weight 67 1 kg (148 lb), last menstrual period 05/23/2022, SpO2 95 %  ,Body mass index is 26 22 kg/m²  Constitutional: Patient appears in no acute distress  HEENT: PERR, EOMI, MMM  Cardiovascular: Normal rate and regular rhythm  Pulmonary/Chest: Effort normal and breath sounds normal    Abdomen:  Nondistended  Assessment/Plan:  Jocelyne Wagner is a(n) 29 y o  adult with psychosis, autism      1  Arthralgias/headache  Patient may take Tylenol as needed, Voltaren gel to use on as-needed basis  2  Dry eyes  Patient may use artificial tears as needed  3  Tobacco use  Nicorette gum as needed  4  Insomnia  Melatonin at bedtime, Trazodone as needed  5  Allergic reaction to food  Patient received Benadryl 25 mg x1

## 2022-06-01 NOTE — PROGRESS NOTES
[facility] [id number] [facility] 29 [facility]  [facility] MRN: [id number]   Unit/Bed#: U 251-01 Encounter: [id number]    Behavior over the last 24 hours: unchanged  Adele Du seen today, per staff report has been demanding on the unit  Patient seen today for follow-up and reports that they is not currently feeling any symptoms of depression  Denies any thoughts of self-harm or harm of others  Has multiple complaints about the inpatient milieu  Patient does not display bizarre or delusional behavior however does appear to have illogical thinking at times  Is quite circumstantial and at times even tangential   Patient reports they feels they is ready for discharge  They expresses that she has plans to return to her adoptive mother, Ruma's home  Sleep and appetite fair    Compliant with medication with no reported side effects      Mental Status Evaluation:    Appearance:  casually dressed, adequate grooming, looks stated age   Behavior:  demanding, guarded   Speech:  normal rate and volume   Mood:  anxious   Affect:  constricted   Thought Process:  circumstantial   Associations: circumstantial associations   Thought Content:  no overt delusions, ruminations   Perceptual Disturbances: no auditory hallucinations, no visual hallucinations   Risk Potential: Suicidal ideation - None at present  Homicidal ideation - None at present   Sensorium:  oriented to person, place and time/date   Memory:  recent and remote memory grossly intact   Consciousness:  alert and awake   Attention: attention span and concentration are age appropriate   Insight:  partial   Judgment: partial   Gait/Station: normal gait/station   Motor Activity: no abnormal movements     Vital signs in last 24 hours:    Temp:  [98 4 °F (36 9 °C)-98 6 °F (37 °C)] 98 4 °F (36 9 °C)  HR:  [63-75] 75  Resp:  [16] 16  BP: (141-149)/(68-71) 149/68    Laboratory results: I have personally reviewed all pertinent laboratory/tests results  Assessment/Plan   Principal Problem:    Psychosis ([facility] [address] )  Active Problems:    Autism    Recommended Treatment:     Planned medication and treatment changes:  Continue current medication  Discharge planning, while patient is under the impression they will be discharged to her adoptive mother's home  Phone call with adoptive family appears that they are not in favor of having the patient return to their home  Patient's discharge planning will require possible discharge to a shelter      All current active medications have been reviewed  Encourage group therapy, milieu therapy and occupational therapy  Behavioral Health checks every 7 minutes  Current Facility-Administered Medications   Medication Dose Route Frequency Provider Last Rate    acetaminophen  650 mg Oral Q6H PRN Stafford Cullen Medei, CRNP      acetaminophen  650 mg Oral Q4H PRN Stafofrd Cullen Medei, CRNP      acetaminophen  975 mg Oral Q6H PRN Stafford Cullen Medei, CRNP      aluminum-magnesium hydroxide-simethicone  30 mL Oral Q4H PRN Stafford Cullen Medei, CRNP      haloperidol lactate  2 5 mg Intramuscular Q6H PRN Max 4/day Stafford Cullen Medei, CRNP      And    LORazepam  1 mg Intramuscular Q6H PRN Max 4/day Stafford Cullen Medei, CRNP      And    benztropine  0 5 mg Intramuscular Q6H PRN Max 4/day Stafford Clulen Medei, CRNP      haloperidol lactate  5 mg Intramuscular Q4H PRN Max 4/day Stafford Cullen Medei, CRNP      And    LORazepam  2 mg Intramuscular Q4H PRN Max 4/day Stafford Cullen Medei, CRNP      And    benztropine  1 mg Intramuscular Q4H PRN Max 4/day Stafford Cullen Medei, CRNP      benztropine  1 mg Oral Q6H PRN Stafford Cullen Medei, CRNP      Diclofenac Sodium  2 g Topical 4x Daily PRN Mirella Jauregui PA-C      diphenhydrAMINE  25 mg Oral Q6H PRN Jose Perez MD      escitalopram  10 mg Oral Daily Mireya Noon, CRNP      glycerin-hypromellose-  1 drop Both Eyes Q3H PRN Mirella Jauregui PA-C      haloperidol  2 mg Oral Q4H PRN Max 6/day Mireya Noon, CRNP      haloperidol  5 mg Oral Q6H PRN Max 4/day Pama Alexandre Medei, CRNP      haloperidol  5 mg Oral Q4H PRN Max 4/day Pama Alexandre Medei, CRNP      hydrOXYzine HCL  25 mg Oral Q6H PRN Max 4/day Pama Alexandre Medei, CRNP      hydrOXYzine HCL  50 mg Oral Q4H PRN Max 4/day Pama Alexandre Medei, CRNP      Or    LORazepam  1 mg Intramuscular Q4H PRN Pama Alexandre Medei, CRNP      lactase  6,000 Units Oral TID PRN Mirella Jauregui PA-C      LORazepam  1 mg Oral Q6H PRN Pama Pearblossom Medei, CRNP      Or    LORazepam  2 mg Intramuscular Q6H PRN Max 3/day Pama Alexandre Medei, CRNP      melatonin  3 mg Oral HS Anand Heredia MD      nicotine polacrilex  2 mg Oral Q2H PRN Pama Pearblossom Medei, CRNP      OLANZapine  10 mg Oral Daily Merlin Gutierrez PA-C      polyethylene glycol  17 g Oral Daily PRN Pama Alexandre Medei, CRNP         Risks / Benefits of Treatment:    Risks, benefits, and possible side effects of medications explained to patient and patient verbalizes understanding and agreement for treatment  Counseling / Coordination of Care: Total floor / unit time spent today 35 minutes  Greater than 50% of total time was spent with the patient and / or family counseling and / or coordination of care  A description of counseling / coordination of care:  Patient's progress discussed with staff in treatment team meeting  Medications, treatment progress and treatment plan reviewed with patient     Phone call with Negrita Agrawal, adoptive mother's son regarding patient's discharge planning    Vivek Campbell MD 06/01/22

## 2022-06-01 NOTE — PROGRESS NOTES
06/01/22    Team Meeting   Meeting Type Daily Rounds   Team Members Present   Team Members Present Physician;Nurse;   Physician Team Member Dr Valorie Ruano MD, HOSP DR KERRY AGARWAL, [address] [place] [address]   Nursing Team Member Sarabjit Lucero, RN   Social Work Team Member Radha Cannon, [place]   Patient/Family Present   Patient Present No   Patient's Family Present No     Calm, cooperative, denies all, episodes of walking to staff and staring at them, mood controlled, awake all night, possible DC 6/3/22

## 2022-06-01 NOTE — NURSING NOTE
Patient pleasant and cooperative  Medication compliant  Visible on unit  Denies SI,HI,AVH, anxiety or depression  Safety checks continue Q 7 minutes

## 2022-06-01 NOTE — PLAN OF CARE
Problem: SAFETY ADULT  Goal: Patient will remain free of falls  Description: INTERVENTIONS:  - Educate patient/family on patient safety including physical limitations  - Instruct patient to call for assistance with activity   - Consult OT/PT to assist with strengthening/mobility   - Keep Call bell within reach  - Keep bed low and locked with side rails adjusted as appropriate  - Keep care items and personal belongings within reach  - Initiate and maintain comfort rounds  - Make Fall Risk Sign visible to staff  - Apply yellow socks and bracelet for high fall risk patients  - Consider moving patient to room near nurses station  Outcome: Progressing  Goal: Maintain or return to baseline ADL function  Description: INTERVENTIONS:  -  Assess patient's ability to carry out ADLs; assess patient's baseline for ADL function and identify physical deficits which impact ability to perform ADLs (bathing, care of mouth/teeth, toileting, grooming, dressing, etc )  - Assess/evaluate cause of self-care deficits   - Assess range of motion  - Assess patient's mobility; develop plan if impaired  - Assess patient's need for assistive devices and provide as appropriate  - Encourage maximum independence but intervene and supervise when necessary  - Involve family in performance of ADLs  - Assess for home care needs following discharge   - Consider OT consult to assist with ADL evaluation and planning for discharge  - Provide patient education as appropriate  Outcome: Progressing  Goal: Maintains/Returns to pre admission functional level  Description: INTERVENTIONS:  - Perform BMAT or MOVE assessment daily    - Set and communicate daily mobility goal to care team and patient/family/caregiver     - Collaborate with rehabilitation services on mobility goals if consulted  - Out of bed for toileting  - Record patient progress and toleration of activity level   Outcome: Progressing     Problem: Depression  Goal: Treatment Goal: Demonstrate behavioral control of depressive symptoms, verbalize feelings of improved mood/affect, and adopt new coping skills prior to discharge  Outcome: Progressing  Goal: Verbalize thoughts and feelings  Description: Interventions:  - Assess and re-assess patient's level of risk   - Engage patient in 1:1 interactions, daily, for a minimum of 15 minutes   - Encourage patient to express feelings, fears, frustrations, hopes   Outcome: Progressing  Goal: Refrain from harming self  Description: Interventions:  - Monitor patient closely, per order   - Supervise medication ingestion, monitor effects and side effects   Outcome: Progressing  Goal: Refrain from isolation  Description: Interventions:  - Develop a trusting relationship   - Encourage socialization   Outcome: Progressing  Goal: Refrain from self-neglect  Outcome: Progressing  Goal: Attend and participate in unit activities, including therapeutic, recreational, and educational groups  Description: Interventions:  - Provide therapeutic and educational activities daily, encourage attendance and participation, and document same in the medical record   Outcome: Progressing  Goal: Complete daily ADLs, including personal hygiene independently, as able  Description: Interventions:  - Observe, teach, and assist patient with ADLS  -  Monitor and promote a balance of rest/activity, with adequate nutrition and elimination   Outcome: Progressing     Problem: Anxiety  Goal: Anxiety is at manageable level  Description: Interventions:  - Assess and monitor patient's anxiety level  - Monitor for signs and symptoms (heart palpitations, chest pain, shortness of breath, headaches, nausea, feeling jumpy, restlessness, irritable, apprehensive)  - Collaborate with interdisciplinary team and initiate plan and interventions as ordered    - [place] patient to unit/surroundings  - Explain treatment plan  - Encourage participation in care  - Encourage verbalization of concerns/fears  - Identify coping mechanisms  - Assist in developing anxiety-reducing skills  - Administer/offer alternative therapies  - Limit or eliminate stimulants  Outcome: Progressing

## 2022-06-01 NOTE — SOCIAL WORK
SW made referral to [address] [place] [address] adult [facility]  Program will reach out to insurance due to program being out of network  Program has 3-4 week wait list for PHP  Program is held Monday-Friday for 6 weeks, 9am-3:30pm in person  Address: [facility], [address] [facility] [address]    Pt is scheduled for PHP assessment intake on Friday 6/3/22 over the phone with DANIEL at 9:45AM  Program to call DANIEL on office mobile phone         DANIEL spoke to Tang at [phone]

## 2022-06-01 NOTE — SOCIAL WORK
Pt has follow up appt with PCP in person on Wed, Brigitte 15, 2022 at 2:30pm      DARINEL Signed:  [address]  [address] [place] [address], [address] [place] [address]  Phone: [phone]  Faxes: [phone]  (Send DC to both fax numbers)

## 2022-06-01 NOTE — NURSING NOTE
Pt AAOX4, calm, cooperative, and med compliant  Pt denies SI, HI, AVH, depression, and anxiety  Pt had one outburst in regard to need to return art supplies when not using them  Talked at length to pt about this and reiterated the need to do so and pt later returned to writer and apologized for outburst stating that behavior is not a reflection of their character  Thanked pt and reminded them that it is not a personalized rule for them but for safety of the entire unit  No issues or distress

## 2022-06-01 NOTE — SOCIAL WORK
Sw left VM for Genie Iyer (Ave Mohinder [phone]) following up from yesterday regarding new pt intake for dc follow up  Sw spoke to pt Mother, Britney Mays ([phone])  Pt is not able to return home at this time due to other children in the home being uncomfortable and not having a specific room for pt to live in  [facility] states pt is not ready/safe to return home and pt knows how to lie in order to manipulate staff/doctors  Skinny wants pt to sign DARINEL for SW to speak to Yolande Meza (masters in clinical psych) in order to clear DC plans/follow up CM

## 2022-06-01 NOTE — SOCIAL WORK
Pt spoke to Jean Sosa ([phone])  Pt is not able to return home due to family not feeling pt will be safe/med comp  Marquise Segovia requested case manger referral to [facility]  Pt will be dc to shelter  Sw left VM for Flaco Kelvinkadeem ([phone]) requesting a call back for SW/mother to speak to pt about dc plan to shelter  SW will follow up with mother at a later time         DARINEL signed:  Jean Sosa  [phone]

## 2022-06-01 NOTE — PROGRESS NOTES
06/01/22 1030   Activity/Group Checklist   Group   (Self Reflection Collage Art Therapy Processing)   Attendance Attended   Attendance Duration (min) 16-30   Interactions Did not interact   Affect/Mood Appropriate;Calm;Constricted   Goals Achieved Able to listen to others

## 2022-06-01 NOTE — PROGRESS NOTES
06/01/22 0730   Activity/Group Checklist   Group   (Community Meeting and Check-In)   Attendance Attended   Attendance Duration (min) 16-30   Interactions Other (Comment)  (Interacted when prompted)   Affect/Mood Calm;Constricted   Goals Achieved Able to listen to others; Able to engage in interactions

## 2022-06-01 NOTE — NURSING NOTE
Patient remains awake  No behaviors noted  Walking in hallway at times  Refused any PRNs  Will monitor

## 2022-06-01 NOTE — NURSING NOTE
Patient still awake  Patient can't sleep  Requested shower to help relax  Will monitor   Safety checks continue

## 2022-06-01 NOTE — NURSING NOTE
Patient came to nurses station requesting benadryl for an allergic reaction to breakfast  They stated, they have an allergy to gluten and the breakfast had gluten  Staff checked them breakfast and it appeared to be gluten free  The patient stated, difficulty and feeling "the throat was closing off" patient did not appear to be in acute distress  No swelling or redness noted  Medical doctor made aware and benadryl 25 ordered and given  The patient then apologize to this writer and stated they were nervious and apologize to be screaming at the nurses station  The patient does not believe they need to be here and requested to go home  The patient denies any SI/HI  Denies any depression or any other concerns  Tylenol given for a headache this shift  Note given to the doctor, written by patient about wanting to go home and not feeling they can legally keep they here  Q 7 mins checks in place for safety  Will continue to monitor for behaviors and changes

## 2022-06-02 PROCEDURE — 99232 SBSQ HOSP IP/OBS MODERATE 35: CPT | Performed by: PSYCHIATRY & NEUROLOGY

## 2022-06-02 RX ADMIN — DIPHENHYDRAMINE HCL 25 MG: 25 TABLET ORAL at 08:10

## 2022-06-02 RX ADMIN — Medication 3 MG: at 22:16

## 2022-06-02 RX ADMIN — NICOTINE POLACRILEX 2 MG: 2 GUM, CHEWING BUCCAL at 18:06

## 2022-06-02 RX ADMIN — NICOTINE POLACRILEX 2 MG: 2 GUM, CHEWING BUCCAL at 20:58

## 2022-06-02 RX ADMIN — OLANZAPINE 10 MG: 10 TABLET, ORALLY DISINTEGRATING ORAL at 08:15

## 2022-06-02 RX ADMIN — ESCITALOPRAM OXALATE 10 MG: 10 TABLET ORAL at 08:15

## 2022-06-02 RX ADMIN — GLYCERIN, HYPROMELLOSE, POLYETHYLENE GLYCOL 1 DROP: .2; .2; 1 LIQUID OPHTHALMIC at 08:15

## 2022-06-02 RX ADMIN — NICOTINE POLACRILEX 2 MG: 2 GUM, CHEWING BUCCAL at 08:36

## 2022-06-02 RX ADMIN — NICOTINE POLACRILEX 2 MG: 2 GUM, CHEWING BUCCAL at 15:44

## 2022-06-02 RX ADMIN — NICOTINE POLACRILEX 2 MG: 2 GUM, CHEWING BUCCAL at 03:00

## 2022-06-02 RX ADMIN — GLYCERIN, HYPROMELLOSE, POLYETHYLENE GLYCOL 1 DROP: .2; .2; 1 LIQUID OPHTHALMIC at 15:44

## 2022-06-02 RX ADMIN — NICOTINE POLACRILEX 2 MG: 2 GUM, CHEWING BUCCAL at 12:42

## 2022-06-02 NOTE — SOCIAL WORK
SW spoke to Jaylon murphy at [facility]  in [place]  ([phone]),    Jaylon murphy states: the pt will need to apply for welfare Medicaid MH benefits upon dc through the [employer] of [employer] ([phone])  [facility] only accepts Medicaid MH benefits

## 2022-06-02 NOTE — SOCIAL WORK
SW and CM spoke to Law at [facility] [place] ([phone])  Law states there is no [facility] funding available for pt without welfare benefits  Law directed patient to call or access Bernadette directly upon dc to [place]  Johnny Leigh can complete an intake for a small fee/sliding scale for services, which will also be on a sliding scale depending on pt needs       Johnny Leigh  [phone]  [address]

## 2022-06-02 NOTE — SOCIAL WORK
Sw left vm for pt mother Kandis Bruce ([phone])  Sw spoke to Kurt Haddad ([phone], pt brother) regarding possible Dc tomorrow or Monday  SW to call Kurt Haddad and family back when definite time/date is established  Pt family is planning to drive with pt to their apartment in [place]

## 2022-06-02 NOTE — PROGRESS NOTES
Progress Note - Lauro Garland 29 y o  adult MRN: [id number]    Unit/Bed#: [facility] 251-01 Encounter: [id number]        Subjective:   Patient seen and examined at bedside after reviewing the chart and discussing the case with the caring staff  Patient examined at bedside  Patient has no acute complaints  Patient is a possible discharge for Friday 06/03/2022  Physical Exam   Vitals: Blood pressure 147/81, pulse 75, temperature (!) 96 7 °F (35 9 °C), temperature source Temporal, resp  rate 16, height 5' 3" (1 6 m), weight 67 1 kg (148 lb), last menstrual period 05/23/2022, SpO2 95 %  ,Body mass index is 26 22 kg/m²  Constitutional: Patient appears in no acute distress  HEENT: PERR, EOMI, MMM  Cardiovascular: Normal rate and regular rhythm  Pulmonary/Chest: Effort normal and breath sounds normal    Abdomen:  Nondistended  Assessment/Plan:  Davey Rockwell is a(n) 29 y o  adult with psychosis, autism      1  Arthralgias/headache  Patient may take Tylenol as needed, Voltaren gel to use on as-needed basis  2  Dry eyes  Patient may use artificial tears as needed  3  Tobacco use  Nicorette gum as needed  4  Insomnia  Melatonin at bedtime, Trazodone as needed  The patient was discussed with Dr Abbie Jennings and he is in agreement with the above note

## 2022-06-02 NOTE — PROGRESS NOTES
06/02/22 0730   Activity/Group Checklist   Group   (Community Meeting and Check-In)   Attendance Attended   Attendance Duration (min) 46-60   Interactions Interacted appropriately   Affect/Mood Appropriate   Goals Achieved Identified feelings; Discussed coping strategies; Able to listen to others; Able to engage in interactions

## 2022-06-02 NOTE — DISCHARGE INSTR - APPOINTMENTS
Please attend your in person follow up with your provider Dr Hermilo Alcaraz [facility]), located at [address], [address] (Phone: [phone])

## 2022-06-02 NOTE — DISCHARGE INSTR - OTHER ORDERS
You are being discharged to your apartment located at [address] [facility],[address], [address] [facility], [address], [address] (Phone: [phone])  Triggers you have identified during your hospitalization that led to your admission of distressed mood include recent resignation at your job and life changes  Coping skills you have identified during your hospitalization include art, sensory devices, and music If you are unable to deal with your distressed mood alone please contact provider Dr Nathan Yadav at [phone]  If that is not effective and you continue to have distressed mood, are overwhelmed, or in crisis, please contact , [phone], dial [phone] or go to the nearest emergency center  *[place] Crisis Hotline: (865) 459-5731  *National Suicide Prevention Lifeline:  0-948.249.5599  *Alcohol Anonymous: 483.273.3330  *[place] Drug & Alcohol Commission: (712) 580-1047  [address] [employer]  on [address] [facility] ([facility]) HELPLINE: 145.437.9494/Website: www miller org  *Substance Abuse and [address] [facility]) [employer], which is a confidential, free, 24-hour-a-day, 365-day-a-year, information service for individuals and family members facing mental health and/or substance use disorders  This service provides referrals to local treatment facilities, support groups, and community-based organizations  Callers can also order free publications and other information  Call 8-611.617.9375/Website: www [facility] gov  *[facility] 2-1-1: This is a toll free, confidential, 24-hour-a-day service which connects you to a community  in your area who can help you find services and resources that are available to you locally and provide critical services that can improve and save lives  Call: 211  /Website: https://[employer]alexander net/     If you are having thoughts of self-harm, please contact 2-005-JHOHXQG (5-454.288.7429) or 0-891-281-TALK ([phone])       Please go to the [facility] Assistance Office, located at [place] [address], [address], Phone: [phone] to apply for welfare benefits in order to access county assisted programs  Please go to or call Eddie Ravi, located at [address] [place] [address], [address] [place] [address], Phone: [phone], to complete an intake at a small or sliding fee charge  Eddie Ravi can provide services at a small or sliding fee charge depending on your needs and ability to pay at that time  Your medications will be sent to [facility], located at [address], Phone: [phone]    Nayely Chirinos or Tamela, our [employer], will be calling you after your discharge, on the phone number that you provided  They will be available as an additional support, if needed  If you wish to speak with one of them, you may contact Oumar Jacob at [phone] or Cynthia Nunez at [phone]

## 2022-06-02 NOTE — PROGRESS NOTES
06/02/22 1330   Activity/Group Checklist   Group   (Creative Expression)   Attendance Attended   Attendance Duration (min) 46-60   Interactions Interacted appropriately   Affect/Mood Appropriate;Calm   Goals Achieved Identified feelings; Identified triggers; Discussed coping strategies; Able to listen to others; Able to engage in interactions

## 2022-06-02 NOTE — SOCIAL WORK
DANIEL left voicemail for [employer] at [phone] regarding case management services  DANIEL requested a phone call back

## 2022-06-02 NOTE — NURSING NOTE
Patient visible on unit  Cooperative and pleasant  Upon room search multiple lotions, beads and dominos [facility]  Denies SI,HI,AVH, anxiety or depression  Benadryl given at 2011 patient stated she had gluten with her snack  Will monitor for any symptoms  Safety checks continue Q 7 minutes

## 2022-06-02 NOTE — PROGRESS NOTES
06/02/22   Team Meeting   Meeting Type Daily Rounds   Team Members Present   Team Members Present Physician;Nurse;   Physician Team Member Dr Jesse Hinton MD, HOSP DR KERRY AGARWAL, [address] [place] [address]   Nursing Team Member Nubia Shields, [facility]   Social Work Team Member Carroll Vázquez [place]   Patient/Family Present   Patient Present No   Patient's Family Present No     Pleasant, upset with one peer, DC focused, med comp, attention seeking, expresses needs, attends group

## 2022-06-02 NOTE — NURSING NOTE
Patient awake and to nurses station requesting rubix cube to calm her down, stating it is her coping mechanism  Patient visible upset and delusional talking  Spoke with patient for several minutes able to calm her down  Will pass on to on coming shift regarding above

## 2022-06-02 NOTE — PROGRESS NOTES
06/02/22 1030   Activity/Group Checklist   Group   (Creative Pet AT)   Attendance Attended   Attendance Duration (min) Greater than 60   Interactions Interacted appropriately   Affect/Mood Appropriate;Calm   Goals Achieved Identified feelings; Able to listen to others; Able to engage in interactions

## 2022-06-02 NOTE — SOCIAL WORK
Pt and provider spoke with pt regarding dc planning  Provider agreed to review pt created pro/con list and future plans to maintain [address] tx  Tx team to re-evaluate tomorrow 6/3 morning in order to determine date of dc  During this meeting, pt was alert and communicated their needs  SW explained pt insurance lapsed on 5/31/22, lack of family/friend support, no income, and limited resources in [place]  Pt states their friend, Kadenalma Jenkinskandace, can help support pt upon dc

## 2022-06-02 NOTE — NURSING NOTE
Pt was visible on the unit but withdrawn self  Pt received PRN benadryl for allergies which was effective  Pt attempted to get saltines for a snack  Pt was told they were not gluten free, pt stated, " nothing in this place is gluten free, I hope you know that " Pt denies anxiety, depression, SI/HI/AVH  Pt is fixated on discharge  Support offered  Q 7 minute safety checks were maintained

## 2022-06-03 PROCEDURE — 99232 SBSQ HOSP IP/OBS MODERATE 35: CPT | Performed by: HOSPITALIST

## 2022-06-03 RX ORDER — ESCITALOPRAM OXALATE 10 MG/1
10 TABLET ORAL DAILY
Qty: 30 TABLET | Refills: 1 | Status: SHIPPED | OUTPATIENT
Start: 2022-06-04 | End: 2022-08-03

## 2022-06-03 RX ORDER — OLANZAPINE 10 MG/1
10 TABLET ORAL
Qty: 30 TABLET | Refills: 1 | Status: SHIPPED | OUTPATIENT
Start: 2022-06-03 | End: 2022-08-02

## 2022-06-03 RX ORDER — LANOLIN ALCOHOL/MO/W.PET/CERES
3 CREAM (GRAM) TOPICAL
Qty: 30 TABLET | Refills: 1 | Status: SHIPPED | OUTPATIENT
Start: 2022-06-03 | End: 2022-08-02

## 2022-06-03 RX ADMIN — NICOTINE POLACRILEX 2 MG: 2 GUM, CHEWING BUCCAL at 08:20

## 2022-06-03 RX ADMIN — Medication 3 MG: at 21:03

## 2022-06-03 RX ADMIN — NICOTINE POLACRILEX 2 MG: 2 GUM, CHEWING BUCCAL at 15:38

## 2022-06-03 RX ADMIN — NICOTINE POLACRILEX 2 MG: 2 GUM, CHEWING BUCCAL at 18:07

## 2022-06-03 RX ADMIN — GLYCERIN, HYPROMELLOSE, POLYETHYLENE GLYCOL 1 DROP: .2; .2; 1 LIQUID OPHTHALMIC at 11:35

## 2022-06-03 RX ADMIN — OLANZAPINE 10 MG: 10 TABLET, ORALLY DISINTEGRATING ORAL at 08:19

## 2022-06-03 RX ADMIN — NICOTINE POLACRILEX 2 MG: 2 GUM, CHEWING BUCCAL at 23:00

## 2022-06-03 RX ADMIN — ACETAMINOPHEN 975 MG: 325 TABLET ORAL at 01:43

## 2022-06-03 RX ADMIN — ESCITALOPRAM OXALATE 10 MG: 10 TABLET ORAL at 08:19

## 2022-06-03 RX ADMIN — NICOTINE POLACRILEX 2 MG: 2 GUM, CHEWING BUCCAL at 11:34

## 2022-06-03 NOTE — PROGRESS NOTES
Progress Note - [place] 29 y o  adult MRN: @MRN   Unit/Bed#: U 251-01 Encounter: [id number]      Report from staff regarding this patient received and discussed, and records reviewed prior to seeing this patient  Behavior over the last 24 hours: improving  Today the writer discussed with the patient in the presence of patient's   discharge planning and recent stressors associated with the fact that the patient's family did not want the patient to live with them after patient is eviction day  Patient remains anxious and appropriate today  Sleep: improved  Appetite: fair  Medication side effects: No     Mental Status Evaluation:    Appearance:  casually dressed   Mood:  improved, anxious   Affect: reactive, brighter    Speech:  fluent   Thought Content:  normal   Perceptual Disturbances: no auditory hallucinations, no visual hallucinations, denies auditory hallucinations when asked, does not appear responding to internal stimuli   Risk Potential: Suicidal ideation - None  Homicidal ideation - None  Potential for aggression - No   Insight:  improving   Judgment: improving   Motor Activity: no abnormal movements         Laboratory results:  I have personally reviewed all pertinent laboratory results  Progress Toward Goals: improving    Assessment/Plan   Principal Problem:    Psychosis ([address] )  Active Problems:    Autism    Recommended Treatment:   The patient and the writer discussed implementation of CBT therapy with writing down positive and negative thoughts associated with the patient's discharge planning  No medication changes today  There was no indication that the patient continue to have symptoms of psychosis  Planned medication and treatment changes: All current active medications have been reviewed  Continue treatment with group therapy, milieu therapy, occupational therapy and medication management        ** Please Note: This note has been constructed using a voice recognition system  **      BMP: No results for input(s): NA, K, CL, CO2, BUN in the last 72 hours      Invalid input(s): CREA, GLU  Vitals:    06/02/22 1638   BP: 151/95   Pulse: (!) 112   Resp: 18   Temp: 98 1 °F (36 7 °C)   SpO2: 99%        Medication Administration - last 24 hours from 06/01/2022 2114 to 06/02/2022 2114       Date/Time Order Dose Route Action Action by     06/02/2022 0815 escitalopram (LEXAPRO) tablet 10 mg 10 mg Oral Given Mitzy Corn, LPN     31/33/6997 8212 nicotine polacrilex (NICORETTE) gum 2 mg 2 mg Oral Given Glenn Ospina, RN     06/02/2022 1806 nicotine polacrilex (NICORETTE) gum 2 mg 2 mg Oral Given Mitzy Corn, LPN     95/75/5550 3863 nicotine polacrilex (NICORETTE) gum 2 mg 2 mg Oral Given Mitzy Corn, LPN     76/47/2613 6709 nicotine polacrilex (NICORETTE) gum 2 mg 2 mg Oral Given Mitzy Corn, LPN     76/16/8092 8276 nicotine polacrilex (NICORETTE) gum 2 mg 2 mg Oral Given Mitzy Corn, LPN     11/29/3434 1575 nicotine polacrilex (NICORETTE) gum 2 mg 2 mg Oral Given Collin Letha, RN     06/02/2022 1544 glycerin-hypromellose- (ARTIFICIAL TEARS) ophthalmic solution 1 drop 1 drop Both Eyes Given Mitzy Corn, LPN     54/06/0695 9063 glycerin-hypromellose- (ARTIFICIAL TEARS) ophthalmic solution 1 drop 1 drop Both Eyes Given Mitzy Corn, LPN     75/23/6257 2070 melatonin tablet 3 mg   Oral Canceled Entry Cordelia Garcia, LPN     22/87/8337 7058 OLANZapine (ZyPREXA ZYDIS) dispersible tablet 10 mg 10 mg Oral Given Mitzy Corn, LPN     28/17/2739 9604 diphenhydrAMINE (BENADRYL) tablet 25 mg 25 mg Oral Given Mitzy Corn, LPN

## 2022-06-03 NOTE — NURSING NOTE
Presents with 'forced' bright affect  Denies SI,HI,AH,VH,depression,and anxiety,and does agree to inform staff of any SI  They attend groups with active participation:visible on unit,social with both staff and peers and is kind to peers  In addition to documented education we discussed the s/s of impending psychological decompensation and when to seek assistance;stated understanding  Will continue to educate,monitor,and provide safe,therapeutic,milieu

## 2022-06-03 NOTE — NURSING NOTE
Patient awake on unit   Alert and oriented  Continue to be labile and demanding  Note tearful  Nurse attempted to provide support  Denies hallucination, HI, SI  Schedule PO medication administered as ordered  Appetite good   Continue on safety check

## 2022-06-03 NOTE — SOCIAL WORK
DANIEL left VM for Mother-Ruma Landry   [phone] regarding dc for Monday  Daniel also called and spoke to Jeana Gore- [phone]  Glenis Jean will follow up with Estela Yanez and DANIEL regarding pharmacy and DC time for Monday

## 2022-06-03 NOTE — SOCIAL WORK
Alexsander told pt tx team scheduled their dc for Monday  Alexsander to follow up with pt family for dc time  Pt was upset, but agreeable  Pt was heard crying in their room  ALEXSANDER notified nursing staff of pt reaction

## 2022-06-03 NOTE — PROGRESS NOTES
Progress Note - Tamra Garland 29 y o  adult MRN: [id number]    Unit/Bed#: [facility] 251-01 Encounter: [id number]        Subjective:   Patient seen and examined at bedside after reviewing the chart and discussing the case with the caring staff  Patient examined at bedside  Patient has no acute complaints  Patient is a possible discharge for Monday 06/06/2022    Physical Exam   Vitals: Blood pressure 139/79, pulse 57, temperature 98 5 °F (36 9 °C), temperature source Temporal, resp  rate 16, height 5' 3" (1 6 m), weight 67 1 kg (148 lb), last menstrual period 05/23/2022, SpO2 97 %  ,Body mass index is 26 22 kg/m²  Constitutional: Patient appears in no acute distress  HEENT: PERR, EOMI, MMM  Cardiovascular: Normal rate and regular rhythm  Pulmonary/Chest: Effort normal and breath sounds normal    Abdomen:  Nondistended  Assessment/Plan:  Edgard Melissa is a(n) 29 y o  adult with psychosis, autism      1  Arthralgias/headache  Patient may take Tylenol as needed, Voltaren gel to use on as-needed basis  2  Dry eyes  Patient may use artificial tears as needed  3  Tobacco use  Nicorette gum as needed  4  Insomnia  Melatonin at bedtime, Trazodone as needed  The patient was discussed with Dr Diane Lock and he is in agreement with the above note

## 2022-06-03 NOTE — PROGRESS NOTES
[id number] [facility] 29 [facility]  [facility] MRN: [id number]   Unit/Bed#: U 251-01 Encounter: [id number]    Behavior over the last 24 hours: some improvement  Leodan Elva seen today, per staff report has been demanding  on the unit  Patient is seen in follow-up today  She has no evidence of psychotic thought process or delusional thinking that she was admitted to the hospital for however her mood is somewhat labile, she is quite demanding and dissatisfied with all attempts to assist her in her demands  Patient requires hospitalization for ongoing treatment, her disposition plans which were originally to return home with adoptive mother were canceled as the family does not want her to return to their home  As such patient will require returning to her apartment in [place]  Appropriate follow-up and discharge plan in progress towards that means          Mental Status Evaluation:    Appearance:  casually dressed, adequate grooming, looks stated age   Behavior:  demanding   Speech:  normal rate, normal volume   Mood:  anxious, labile   Affect:  inappropriate, reactive   Thought Process:  linear   Associations: circumstantial associations   Thought Content:  no overt delusions   Perceptual Disturbances: no auditory hallucinations, no visual hallucinations   Risk Potential: Suicidal ideation - None at present  Homicidal ideation - None at present   Sensorium:  oriented to person, place and time/date   Memory:  recent and remote memory grossly intact   Consciousness:  alert and awake   Attention: decreased concentration and decreased attention span   Insight:  limited   Judgment: limited   Gait/Station: normal gait/station   Motor Activity: no abnormal movements     Vital signs in last 24 hours:    Temp:  [98 1 °F (36 7 °C)-98 5 °F (36 9 °C)] 98 5 °F (36 9 °C)  HR:  [] 57  Resp:  [16-18] 16  BP: (139-151)/(79-95) 139/79    Laboratory results: I have personally reviewed all pertinent laboratory/tests results  Assessment/Plan   Principal Problem:    Psychosis (HCC)  Active Problems:    Autism    Recommended Treatment:     Planned medication and treatment changes:  Continue Lexapro 10 mg daily  Continue melatonin 3 mg at bedtime  Continue Zyprexa 10 mg daily at bedtime    All current active medications have been reviewed  Encourage group therapy, milieu therapy and occupational therapy  Behavioral Health checks every 7 minutes  Current Facility-Administered Medications   Medication Dose Route Frequency Provider Last Rate    acetaminophen  650 mg Oral Q6H PRN Clari Alise Medei, CRNP      acetaminophen  650 mg Oral Q4H PRN Clari Alise Medei, CRNP      acetaminophen  975 mg Oral Q6H PRN Clari Alise Medei, CRNP      aluminum-magnesium hydroxide-simethicone  30 mL Oral Q4H PRN Clari Alise Medei, CRNP      haloperidol lactate  2 5 mg Intramuscular Q6H PRN Max 4/day Clari Alise Medei, CRNP      And    LORazepam  1 mg Intramuscular Q6H PRN Max 4/day Clari Alise Medei, CRNP      And    benztropine  0 5 mg Intramuscular Q6H PRN Max 4/day Clari Alise Medei, CRNP      haloperidol lactate  5 mg Intramuscular Q4H PRN Max 4/day Clari Alise Medei, CRNP      And    LORazepam  2 mg Intramuscular Q4H PRN Max 4/day Clari Alise Medei, CRNP      And    benztropine  1 mg Intramuscular Q4H PRN Max 4/day Clari Alise Medei, CRNP      benztropine  1 mg Oral Q6H PRN Clari Alise Medei, CRNP      Diclofenac Sodium  2 g Topical 4x Daily PRN Mirella Jauregui PA-C      diphenhydrAMINE  25 mg Oral Q6H PRN Navid Chinchilla MD      escitalopram  10 mg Oral Daily Clair Alise Medei, CRNP      glycerin-hypromellose-  1 drop Both Eyes Q3H PRN Mirlela Jauregui PA-C      haloperidol  2 mg Oral Q4H PRN Max 6/day Clari Alise Medei, CRNP      haloperidol  5 mg Oral Q6H PRN Max 4/day Clari Alise Medei, CRNP      haloperidol  5 mg Oral Q4H PRN Max 4/day Clari Alise Medei, CRNP      hydrOXYzine HCL  25 mg Oral Q6H PRN Max 4/day 2 WHIT Ruiz      hydrOXYzine HCL  50 mg Oral Q4H PRN Max 4/day Stafford Cullen Medei, CRNP      Or    LORazepam  1 mg Intramuscular Q4H PRN Nydia Cullen Medei, CRNP      lactase  6,000 Units Oral TID PRN Mirella Jauregui PA-C      LORazepam  1 mg Oral Q6H PRN Stafford Cullen Medei, CRNP      Or    LORazepam  2 mg Intramuscular Q6H PRN Max 3/day Stafford Cullen Medei, CRNP      melatonin  3 mg Oral HS Pamela Andres MD      nicotine polacrilex  2 mg Oral Q2H PRN Stafford Cullen Medei, CRNP      OLANZapine  10 mg Oral Daily Tawny Gutierrez PA-C      polyethylene glycol  17 g Oral Daily PRN Stafford Cullen Medei, CRNP         Risks / Benefits of Treatment:    Risks, benefits, and possible side effects of medications explained to patient and patient verbalizes understanding and agreement for treatment  Counseling / Coordination of Care: Total floor / unit time spent today 25 minutes  Greater than 50% of total time was spent with the patient and / or family counseling and / or coordination of care  A description of counseling / coordination of care:  Patient's progress discussed with staff in treatment team meeting  Medications, treatment progress and treatment plan reviewed with patient      Catia Cedeño MD 06/03/22

## 2022-06-03 NOTE — PLAN OF CARE
Problem: Ineffective Coping  Goal: Participates in unit activities  Description: Interventions:  - Provide therapeutic environment   - Provide required programming   - Redirect inappropriate behaviors   Outcome: Progressing     PT continues to attend most art therapy groups  PT's mood has been labile throughout the day  During art therapy groups they are able to actively participant and become more calm and focus  PT has appropriate social interactions with peer and staff

## 2022-06-03 NOTE — PLAN OF CARE
Problem: Depression  Goal: Treatment Goal: Demonstrate behavioral control of depressive symptoms, verbalize feelings of improved mood/affect, and adopt new coping skills prior to discharge  Outcome: Progressing  Goal: Verbalize thoughts and feelings  Description: Interventions:  - Assess and re-assess patient's level of risk   - Engage patient in 1:1 interactions, daily, for a minimum of 15 minutes   - Encourage patient to express feelings, fears, frustrations, hopes   Outcome: Progressing  Goal: Refrain from harming self  Description: Interventions:  - Monitor patient closely, per order   - Supervise medication ingestion, monitor effects and side effects   Outcome: Progressing  Goal: Refrain from isolation  Description: Interventions:  - Develop a trusting relationship   - Encourage socialization   Outcome: Progressing  Goal: Refrain from self-neglect  Outcome: Progressing  Goal: Attend and participate in unit activities, including therapeutic, recreational, and educational groups  Description: Interventions:  - Provide therapeutic and educational activities daily, encourage attendance and participation, and document same in the medical record   Outcome: Progressing  Goal: Complete daily ADLs, including personal hygiene independently, as able  Description: Interventions:  - Observe, teach, and assist patient with ADLS  -  Monitor and promote a balance of rest/activity, with adequate nutrition and elimination   Outcome: Progressing     Problem: Anxiety  Goal: Anxiety is at manageable level  Description: Interventions:  - Assess and monitor patient's anxiety level  - Monitor for signs and symptoms (heart palpitations, chest pain, shortness of breath, headaches, nausea, feeling jumpy, restlessness, irritable, apprehensive)  - Collaborate with interdisciplinary team and initiate plan and interventions as ordered    - [place] patient to unit/surroundings  - Explain treatment plan  - Encourage participation in care  - Encourage verbalization of concerns/fears  - Identify coping mechanisms  - Assist in developing anxiety-reducing skills  - Administer/offer alternative therapies  - Limit or eliminate stimulants  Outcome: Progressing

## 2022-06-03 NOTE — SOCIAL WORK
Alexsander canceled Edwina Delcid ([phone]) phone assessment for today due to pt insurance lapsing and no funds for service until they apply for welfare benefits  ALEXSANDER also called Genie Iyer  ([phone]) And canceled pt intake appt due to insurance lapsing and no funds for service

## 2022-06-03 NOTE — SOCIAL WORK
Sw met with pt in the dining room  Pt expressed not wanting to write a list regarding pros/con and goals for dc  Pt showed irritable edge  Pt requested rubix cube but SW explained the safety concern  Pt stated they want to be dc today in order to go home  Pt mother, Bishop Culp ([phone]) called and left SW a voicemail stating they cannot come to drive pt to [place] until Monday 6/6/22 due to work obligations  SW explained this to pt but pt still insists on being dc today

## 2022-06-03 NOTE — PROGRESS NOTES
06/03/22 0730   Activity/Group Checklist   Group   (Community Meeting and Check-In)   Attendance Attended   Attendance Duration (min) 31-45   Interactions Interacted appropriately   Affect/Mood Appropriate;Calm   Goals Achieved Identified feelings; Able to listen to others; Able to engage in interactions

## 2022-06-03 NOTE — PROGRESS NOTES
06/03/22 1030   Activity/Group Checklist   Group   (Open studio with AT)   Attendance Attended   Attendance Duration (min) Greater than 60   Interactions Interacted appropriately   Affect/Mood Appropriate   Goals Achieved Identified feelings; Able to listen to others; Able to engage in interactions

## 2022-06-04 PROCEDURE — 99232 SBSQ HOSP IP/OBS MODERATE 35: CPT | Performed by: PSYCHIATRY & NEUROLOGY

## 2022-06-04 RX ADMIN — NICOTINE POLACRILEX 2 MG: 2 GUM, CHEWING BUCCAL at 08:41

## 2022-06-04 RX ADMIN — LORAZEPAM 1 MG: 1 TABLET ORAL at 23:14

## 2022-06-04 RX ADMIN — NICOTINE POLACRILEX 2 MG: 2 GUM, CHEWING BUCCAL at 15:58

## 2022-06-04 RX ADMIN — BENZTROPINE MESYLATE 1 MG: 1 TABLET ORAL at 23:14

## 2022-06-04 RX ADMIN — HALOPERIDOL 5 MG: 5 TABLET ORAL at 23:13

## 2022-06-04 RX ADMIN — ACETAMINOPHEN 975 MG: 325 TABLET ORAL at 13:20

## 2022-06-04 RX ADMIN — ACETAMINOPHEN 975 MG: 325 TABLET ORAL at 03:23

## 2022-06-04 RX ADMIN — ACETAMINOPHEN 975 MG: 325 TABLET ORAL at 23:26

## 2022-06-04 RX ADMIN — NICOTINE POLACRILEX 2 MG: 2 GUM, CHEWING BUCCAL at 11:18

## 2022-06-04 RX ADMIN — ESCITALOPRAM OXALATE 10 MG: 10 TABLET ORAL at 08:41

## 2022-06-04 RX ADMIN — OLANZAPINE 10 MG: 10 TABLET, ORALLY DISINTEGRATING ORAL at 08:41

## 2022-06-04 RX ADMIN — Medication 3 MG: at 23:24

## 2022-06-04 NOTE — PLAN OF CARE
Problem: PAIN - ADULT  Goal: Verbalizes/displays adequate comfort level or baseline comfort level  Description: Interventions:  - Encourage patient to monitor pain and request assistance  - Assess pain using appropriate pain scale  - Administer analgesics based on type and severity of pain and evaluate response  - Implement non-pharmacological measures as appropriate and evaluate response  - Consider cultural and social influences on pain and pain management  - Notify physician/advanced practitioner if interventions unsuccessful or patient reports new pain  6/4/2022 1030 by Gerson Jorge LPN  Outcome: Progressing  6/4/2022 1029 by Gesron Jorge LPN  Outcome: Progressing  6/4/2022 1027 by Gerson Jorge LPN  Outcome: Progressing

## 2022-06-04 NOTE — NURSING NOTE
Pt was visible on the unit but withdrawn to self  Pt was cooperative with an irritable edge  Pt is displeased with being in the hospital for the weekend  Pt expressed multiple times that we are making them worse because of the stress of being here  Pt reports anxiety but denies depression SI/HI/AVH  Support was offered  Pt struggled with limit setting throughout the shift  Pt becomes very angry when limits are set  Q 7 minute safety checks were maintained

## 2022-06-04 NOTE — PROGRESS NOTES
Progress Note - Roly Garland 29 y o  adult MRN: [id number]    Unit/Bed#: [facility] 251-01 Encounter: [id number]        Subjective:   Patient seen and examined at bedside after reviewing the chart and discussing the case with the caring staff  Patient examined at bedside  Patient has no acute complaints  Patient is a possible discharge for Monday 06/06/2022    Physical Exam   Vitals: Blood pressure 159/86, pulse 58, temperature 98 2 °F (36 8 °C), temperature source Temporal, resp  rate 18, height 5' 3" (1 6 m), weight 69 9 kg (154 lb 3 2 oz), last menstrual period 05/23/2022, SpO2 99 %  ,Body mass index is 27 32 kg/m²  Constitutional: Patient appears in no acute distress  HEENT: PERR, EOMI, MMM  Cardiovascular: Normal rate and regular rhythm  Pulmonary/Chest: Effort normal and breath sounds normal    Abdomen:  Nondistended  Assessment/Plan:  Scott Maier is a(n) 29 y o  adult with psychosis, autism      1  Arthralgias/headache  Patient may take Tylenol as needed, Voltaren gel to use on as-needed basis  2  Dry eyes  Patient may use artificial tears as needed  3  Tobacco use  Nicorette gum as needed  4  Insomnia  Melatonin at bedtime, Trazodone as needed

## 2022-06-04 NOTE — PROGRESS NOTES
Progress Note - Vasquezjoeyva 24 L [address] 29 y o  adult MRN: [id number]  Unit/Bed#: U 251-01 Encounter: [id number]    Assessment/Plan   Principal Problem:    Psychosis ([address] )  Active Problems:    Autism  Patient is interviewed in her room with her permission and is sitting on the floor with paper sprawled about the room  Patient appears moderately agitated and irritable throughout interview  Continues to perseverate on needing her neurologic special equipment in order to be able to use my coping skills    Patient is very threatening throughout interview and threatens legal action against the hospital but cannot elaborate on why this would be  Reports suicidal ideations but denies any plan or intent for this and states that she feels safe on the unit  Denies any homicidal ideations  Denies any hallucinogenic material and does not appear to be responding to internal stimuli though does have delusional thinking pattern  Has high degree of mood lability and overt anxiety  Will increase patient's Zyprexa to assist with delusional thinking and agitation  Will continue Lexapro for mood and anxiety and melatonin for sleep without change at this time  Could consider use of Ativan in the future if agitation remains high despite these changes  Will continue to monitor  Recommended Treatment:   1) Continue Lexapro 10 mg PO QD for mood and anxiety  2) Continue Melatonin 3 mg PO QHS for sleeping difficulties  3) Increase Zyprexa Zydis to 15 mg PO QD for delusional thinking and agitation    Continue with group therapy, milieu therapy and occupational therapy  Continue frequent safety checks and vitals per unit protocol    Case discussed with treatment team   Risks, benefits and possible side effects of Medications: Risks, benefits, and possible side effects of medications have been explained to the patient, who verbalizes understanding    ------------------------------------------------------------    Subjective: Per nursing report, Ramos Dominguez has been somewhat cooperative on the unit and compliant with medications  Today, Ramos Dominguez is consenting for safety on the unit  Giorgi CUNNINGHAM Wadekadeem reports that her current mood is well I am here against my will    Reports that she cannot meet my neurologic issues because she does not have the proper resources  Reports that she is being overmedicated and not being allowed to use my coping skills    Threatens legal action towards this provider and hospital   Displays poor insight into her condition and has high level of mood instability  Endorses that she has suicidal ideations but denies any plan or intent and is able to contract for safety on unit and states that she currently feels safe  Endorses that my brain cannot feel pain    Indicates that she would do better if she was able to have an auditory stimulus  States that she is sleeping but doing too much of this during the daytime  Reports that she is eating without difficulty  Denies any side effects to medication or other physical symptoms at this time  Per nursing staff, patient continues to require her special neurologic quit Min  Has large degree of trauma history and is currently discharge challenge due to adoptive mother not wanting her back in their home  Progress Toward Goals: unchanged    Psychiatric Review of Systems:  Behavior over the last 24 hours: unchanged  Sleep: too much during the day  Appetite: adequate  Medication side effects: none verbalized  ROS: Complete review of systems is negative except as noted above      Vital signs in last 24 hours:  Temp:  [98 2 °F (36 8 °C)] 98 2 °F (36 8 °C)  HR:  [58] 58  Resp:  [18] 18  BP: (159)/(86) 159/86    Mental Status Exam:  Appearance:  alert, good eye contact, appears stated age, casually dressed and appropriate grooming and hygiene   Behavior:  guarded, evasive and demanding and sitting on her room floor with papers sprawled about the room   Motor: no abnormal movements, restless and fidgety, psychomotor agitation and normal gait and balance   Speech:  spontaneous, increased rate, loud and coherent   Mood:  "Well I'm here against my will"   Affect:  mood-congruent, labile, dysphoric, anxious, angry, irritable and demanding   Thought Process:  disorganized, illogical, circumstantial   Thought Content: mild paranoia, ruminating thoughts, negative thoughts   Perceptual disturbances: no reported hallucinations and does not appear to be responding to internal stimuli at this time   Risk Potential: No active or passive suicidal or homicidal ideation was verbalized during interview, Low potential for aggression based on previous behavior   Cognition: oriented to self and situation, memory grossly intact, appears to be of average intelligence, attention span appeared shorter than expected for age and cognition not formally tested   Insight:  Limited   Judgment: Limited     Current Medications:  Current Facility-Administered Medications   Medication Dose Route Frequency Provider Last Rate    acetaminophen  650 mg Oral Q6H PRN Ellin Bard Medei, CRNP      acetaminophen  650 mg Oral Q4H PRN Ellin Bard Medei, CRNP      acetaminophen  975 mg Oral Q6H PRN Ellin Bard Medei, CRNP      aluminum-magnesium hydroxide-simethicone  30 mL Oral Q4H PRN Ellin Bard Medei, CRNP      haloperidol lactate  2 5 mg Intramuscular Q6H PRN Max 4/day Ellin Bard Medei, CRNP      And    LORazepam  1 mg Intramuscular Q6H PRN Max 4/day Ellin Bard Medei, CRNP      And    benztropine  0 5 mg Intramuscular Q6H PRN Max 4/day Ellin Bard Medei, CRNP      haloperidol lactate  5 mg Intramuscular Q4H PRN Max 4/day Ellin Bard Medei, CRNP      And    LORazepam  2 mg Intramuscular Q4H PRN Max 4/day Ellin Bard Medei, CRNP      And    benztropine  1 mg Intramuscular Q4H PRN Max 4/day Christiane M Medei, CRNP      benztropine  1 mg Oral Q6H PRN Bettyann Breaker Medei, CRNP      Diclofenac Sodium  2 g Topical 4x Daily PRN Mirella Jauregui PA-C      diphenhydrAMINE  25 mg Oral Q6H PRN Kavya Shah MD      escitalopram  10 mg Oral Daily Christiane M Medei, CRNP      glycerin-hypromellose-  1 drop Both Eyes Q3H PRN Mirella Jauregui PA-C      haloperidol  2 mg Oral Q4H PRN Max 6/day Christiane M Medei, CRNP      haloperidol  5 mg Oral Q6H PRN Max 4/day Bettyann Breaker Medei, CRNP      haloperidol  5 mg Oral Q4H PRN Max 4/day Bettyann Breaker Medei, CRNP      hydrOXYzine HCL  25 mg Oral Q6H PRN Max 4/day Bettyann Breaker Medei, CRNP      hydrOXYzine HCL  50 mg Oral Q4H PRN Max 4/day Bettyann Breaker Medei, CRNP      Or    LORazepam  1 mg Intramuscular Q4H PRN Bettyann Breaker Medei, CRNP      lactase  6,000 Units Oral TID PRN Mirella Jauregui PA-C      LORazepam  1 mg Oral Q6H PRN Bettyann Breaker Medei, CRNP      Or    LORazepam  2 mg Intramuscular Q6H PRN Max 3/day Bettyann Breaker Medei, CRNP      melatonin  3 mg Oral HS Evette Sotelo MD      nicotine polacrilex  2 mg Oral Q2H PRN Bettyann Breaker Medei, CRNP      OLANZapine  10 mg Oral Daily [place], [place]      polyethylene glycol  17 g Oral Daily PRN Phill Kanner, CRNP         Behavioral Health Medications: all current active meds have been reviewed  Changes as in plan section above  Laboratory results:  I have personally reviewed all pertinent laboratory/tests results  No results found for this or any previous visit (from the past 48 hour(s))       Nidia Tinajero DO

## 2022-06-05 PROCEDURE — 99232 SBSQ HOSP IP/OBS MODERATE 35: CPT | Performed by: PSYCHIATRY & NEUROLOGY

## 2022-06-05 RX ORDER — LORAZEPAM 0.5 MG/1
0.5 TABLET ORAL 2 TIMES DAILY
Status: DISCONTINUED | OUTPATIENT
Start: 2022-06-05 | End: 2022-06-06

## 2022-06-05 RX ORDER — LORAZEPAM 0.5 MG/1
0.5 TABLET ORAL 2 TIMES DAILY
Status: DISCONTINUED | OUTPATIENT
Start: 2022-06-05 | End: 2022-06-05

## 2022-06-05 RX ADMIN — NICOTINE POLACRILEX 2 MG: 2 GUM, CHEWING BUCCAL at 11:57

## 2022-06-05 RX ADMIN — ACETAMINOPHEN 650 MG: 325 TABLET ORAL at 20:53

## 2022-06-05 RX ADMIN — Medication 3 MG: at 20:54

## 2022-06-05 RX ADMIN — GLYCERIN, HYPROMELLOSE, POLYETHYLENE GLYCOL 1 DROP: .2; .2; 1 LIQUID OPHTHALMIC at 09:32

## 2022-06-05 RX ADMIN — ESCITALOPRAM OXALATE 10 MG: 10 TABLET ORAL at 08:10

## 2022-06-05 RX ADMIN — NICOTINE POLACRILEX 2 MG: 2 GUM, CHEWING BUCCAL at 17:30

## 2022-06-05 RX ADMIN — GLYCERIN, HYPROMELLOSE, POLYETHYLENE GLYCOL 1 DROP: .2; .2; 1 LIQUID OPHTHALMIC at 18:25

## 2022-06-05 RX ADMIN — NICOTINE POLACRILEX 2 MG: 2 GUM, CHEWING BUCCAL at 09:31

## 2022-06-05 RX ADMIN — LORAZEPAM 0.5 MG: 0.5 TABLET ORAL at 18:25

## 2022-06-05 RX ADMIN — ACETAMINOPHEN 650 MG: 325 TABLET ORAL at 08:09

## 2022-06-05 RX ADMIN — LORAZEPAM 0.5 MG: 0.5 TABLET ORAL at 11:56

## 2022-06-05 RX ADMIN — OLANZAPINE 10 MG: 10 TABLET, ORALLY DISINTEGRATING ORAL at 08:09

## 2022-06-05 RX ADMIN — NICOTINE POLACRILEX 2 MG: 2 GUM, CHEWING BUCCAL at 20:54

## 2022-06-05 NOTE — PROGRESS NOTES
Progress Note - Leon Garland 29 y o  adult MRN: [id number]    Unit/Bed#: [facility] 251-01 Encounter: [id number]        Subjective:   Patient seen and examined at bedside after reviewing the chart and discussing the case with the caring staff  Patient examined at bedside  Patient has no acute complaints  Patient is a possible discharge for Monday 06/06/2022    Physical Exam   Vitals: Blood pressure 141/64, pulse 84, temperature 97 8 °F (36 6 °C), temperature source Temporal, resp  rate 16, height 5' 3" (1 6 m), weight 69 9 kg (154 lb 3 2 oz), last menstrual period 05/23/2022, SpO2 97 %  ,Body mass index is 27 32 kg/m²  Constitutional: Patient appears in no acute distress  HEENT: PERR, EOMI, MMM  Cardiovascular: Normal rate and regular rhythm  Pulmonary/Chest: Effort normal and breath sounds normal    Abdomen:  Nondistended  Assessment/Plan:  Rosa Sandhu is a(n) 29 y o  adult with psychosis, autism      1  Arthralgias/headache  Patient may take Tylenol as needed, Voltaren gel to use on as-needed basis  2  Dry eyes  Patient may use artificial tears as needed  3  Tobacco use  Nicorette gum as needed  4  Insomnia  Melatonin at bedtime, Trazodone as needed

## 2022-06-05 NOTE — NURSING NOTE
Patient observed in the dayroom sitting by the window stating " Im dying, Im dying ' this writer approached the patient  To provide support  Patient continued staring blankly between their journal and back to the window  Patient got up minutes later walking towards the doorway to the dayroom and began lowering themselves to the floor  Patient was supported/counseled , reminded of her continued progress,patient was asked to get up off of the floor, patient remained on the floor and would not respond, pt visibly breathing with even respriations and no distress  Patient began hitting and somewhat dragging both legs suggesting paralysis to B/L legs  Patient further continued in between lying there in a way that suggest they're unresponsive or attempting to get up but could not also knocking over their water  Patient was safely guided off the floor to w/c and escorted to the quiet room  Patients art supplies and any items that they had were removed for safety  , posey pads were placed on the floor on both sides of the patient  Patient continued to appear unresponsive  Vitals were obtained and wnl with the systolic b/p being  Elevated  Patent jumped up after several minutes in the quiet room completely oblivious to this occurrence and somewhat hysterical crying and saying " I really need my mom , I really need to get out of here its making me sicker, some people are not kind here , I need my Rubex cube and other things that help manage my anxiety and feelings overall "     Patient continued crying and was apologetic for their actions  A po HAC was offered/recptive and administered without difficulty alongside tylenol 650 mg for a headache  Patients markers are with her belongings for the time being   Patient  went to bed stating " I just need some rest thank you guys for taking care of me " patient also request to take daily medications for anxiety, feels this will help them along at difficult times

## 2022-06-05 NOTE — NURSING NOTE
Patient observed sleeping through the night per rounds  no distress noted , patient had even respirations

## 2022-06-05 NOTE — NURSING NOTE
Patient very irritable this shift  When asked how she was doing stated in a loud voice, "I'm done with this  I'm through  I'm finished  You can just find another autistic person to f--k with "  After that she ignored all staff that approached her  Previous shift had noted that she was irritated about not being released before the weekend and was unhappy with any limit setting

## 2022-06-05 NOTE — PROGRESS NOTES
Progress Note - Dimple 24 [address] 29 y o  adult MRN: [id number]  Unit/Bed#: [facility] 251-01 Encounter: [id number]    Assessment/Plan   Principal Problem:    Psychosis ([address] )  Active Problems:    Autism  Patient appears irritable and this provider did have difficulty initially interacting with patient that did become more amendable to interview with repeated attempts  Continues to endorse high level of anxiety and appears moderately agitated  Has been staff splitting and argumentative  Continues to report neurologic issues and had apparent pseudoseizure yesterday that improved after being given Haldol, Ativan, and Cogentin without any further interventions  Continues to report experiencing catatonia at times when she feels depressed but otherwise denies mood symptoms  Does appear somewhat less delusional in thinking on increased dose of Zyprexa which will be continued at this time without change  Will additionally add small dose of Ativan for afternoon and evening hours due to anxiety and agitation  Will continue melatonin in the evening for sleeping difficulties which do persist and additional medication interventions could be warranted for this  Would recommend against discharging early this coming week due to regressed stability but will defer discharge planning to primary psychiatric team   Will continue Lexapro for mood and anxiety without change at this time  Will continue to monitor  Recommended Treatment:   1) Continue Lexapro 10 mg PO QD for mood and anxiety  2) Continue Melatonin 3 mg PO QHS for sleeping difficulties  3) Continue Zyprexa Zydis 15 mg PO QD for delusional thinking and agitation  4) Start Ativan 0 5 mg PO BID for anxiety and agitation  5) would recommend against discharge early this coming week due to regressed ability but will defer discharge planning to primary psychiatric team       Continue with group therapy, milieu therapy and occupational therapy      Continue frequent safety checks and vitals per unit protocol  Case discussed with treatment team   Risks, benefits and possible side effects of Medications: Risks, benefits, and possible side effects of medications have been explained to the patient, who verbalizes understanding    ------------------------------------------------------------    Subjective: Per nursing report, Karo Padilla has been somewhat cooperative on the unit and compliant with medications  Today, Karo Padilla is consenting for safety on the unit  Giorgi Garland reports that her current mood is terrible    Patient is very irritable and states that being on the unit is mouth productive for her and actually causing her side effects  States that she wants to listen to music and demands auditory stimulation  Reports that she had paralytic seizure yesterday in which her body was frozen    Patient remains paranoid about her medications  Reports she had decreased sleep but declines any medication intervention for this  States I need less meds and more people to listen to me    Reports that she is being discharged Monday and is convinced of this but remains aggravated that others are not aware of this on the unit  States that she has disengaging from everybody and trying to confined herself to her room  Additionally reports I'm done helping everybody else and letting them still my energy    Indicates that she is eating but is frustrated with choice of foods on the unit  Denies SI/HI/AVH  Denies any side effects to medication regimen or other physical symptoms at this time  Per nursing staff, patient has been splitting and manipulative  Wants special neurologic interventions  Did have pseudo-seizure yesterday with improvement after given Haldol, Ativan, and Cogentin with no the further interventions      Progress Toward Goals: Regressed    Psychiatric Review of Systems:  Behavior over the last 24 hours: regressed  Sleep: frequent awakenings  Appetite: adequate, but hates food selection  Medication side effects: none verbalized  ROS: "seizure-like activity "    Vital signs in last 24 hours:  Temp:  [97 8 °F (36 6 °C)-98 6 °F (37 °C)] 97 8 °F (36 6 °C)  HR:  [62-84] 84  Resp:  [16] 16  BP: (138-177)/(64-89) 141/64    Mental Status Exam:  Appearance:  alert, good eye contact, appears stated age, casually dressed and appropriate grooming and hygiene   Behavior:  limited cooperativity, guarded, evasive and pacing hallway   Motor: no abnormal movements, restless and fidgety, psychomotor agitation and normal gait and balance   Speech:  spontaneous, increased rate, loud and coherent   Mood:  "Terrible"   Affect:  mood-congruent, labile, depressed, dysphoric, anxious, angry, irritable and demanding   Thought Process:  disorganized, illogical, circumstantial   Thought Content: paranoid ideation, intrusive thoughts, negative thoughts, somatic preoccupation   Perceptual disturbances: no reported hallucinations and does not appear to be responding to internal stimuli at this time   Risk Potential: No active or passive suicidal or homicidal ideation was verbalized during interview, Low potential for aggression based on previous behavior   Cognition: oriented to self and situation, memory grossly intact, appears to be of average intelligence, attention span appeared shorter than expected for age and cognition not formally tested   Insight:  Poor   Judgment: Limited     Current Medications:  Current Facility-Administered Medications   Medication Dose Route Frequency Provider Last Rate    acetaminophen  650 mg Oral Q6H PRN Idell Gaudy Medei, CRNP      acetaminophen  650 mg Oral Q4H PRN Idell Gaudy Medei, CRNP      acetaminophen  975 mg Oral Q6H PRN Idell Gaudy Medei, CRNP      aluminum-magnesium hydroxide-simethicone  30 mL Oral Q4H PRN Idell Gaudy Medei, CRNP      haloperidol lactate  2 5 mg Intramuscular Q6H PRN Max 4/day WHIT Benoit      And    LORazepam  1 mg Intramuscular Q6H PRN Max 4/day Suzette Child Medei, CRNP      And    benztropine  0 5 mg Intramuscular Q6H PRN Max 4/day Suzette Child Medei, CRNP      haloperidol lactate  5 mg Intramuscular Q4H PRN Max 4/day Suzette Child Medei, CRNP      And    LORazepam  2 mg Intramuscular Q4H PRN Max 4/day Suzette Child Medei, CRNP      And    benztropine  1 mg Intramuscular Q4H PRN Max 4/day Suzette Child Medei, CRNP      benztropine  1 mg Oral Q6H PRN Suzette Child Medei, CRNP      Diclofenac Sodium  2 g Topical 4x Daily PRN Mirella Jauregui PA-C      diphenhydrAMINE  25 mg Oral Q6H PRN Reese Singer MD      escitalopram  10 mg Oral Daily Christiane M Medei, CRNP      glycerin-hypromellose-  1 drop Both Eyes Q3H PRN Mirella Jauregui PA-C      haloperidol  2 mg Oral Q4H PRN Max 6/day Suzette Child Medei, CRNP      haloperidol  5 mg Oral Q6H PRN Max 4/day Suzette Child Medei, CRNP      haloperidol  5 mg Oral Q4H PRN Max 4/day Suzette Child Medei, CRNP      hydrOXYzine HCL  25 mg Oral Q6H PRN Max 4/day Suzette Child Medei, CRNP      hydrOXYzine HCL  50 mg Oral Q4H PRN Max 4/day Suzette Child Medei, CRNP      Or    LORazepam  1 mg Intramuscular Q4H PRN Suzette Child Medei, CRNP      lactase  6,000 Units Oral TID PRN Mirella Jauregui PA-C      LORazepam  1 mg Oral Q6H PRN Suzette Child Medei, CRNP      Or    LORazepam  2 mg Intramuscular Q6H PRN Max 3/day Suzette Child Medei, CRNP      LORazepam  0 5 mg Oral BID Linda eSlby DO      melatonin  3 mg Oral HS Diane Coles MD      nicotine polacrilex  2 mg Oral Q2H PRN Suzette Child Medei, CRNP      OLANZapine  10 mg Oral Daily Neha Flor[place], [place]      polyethylene glycol  17 g Oral Daily PRN WHIT Wilkins         Behavioral Health Medications: all current active meds have been reviewed  Changes as in plan section above  Laboratory results:  I have personally reviewed all pertinent laboratory/tests results  No results found for this or any previous visit (from the past 48 hour(s))       Linda Selby,

## 2022-06-05 NOTE — NURSING NOTE
Patient awake and alert , appropriate affect this am, stable and denies any issues or concerns, denies depression and anxiety , denies suicidal ideation  Patient was given some of the art supplies that were held the previous night

## 2022-06-05 NOTE — NURSING NOTE
Pt was visible on the unit  Pt was cooperative with an irritable edge  Pt denies anxiety but appears anxious  Pt denies depression, SI/HI/AVH  Pt was mostly withdrawn to self, using drawing as their coping skill  Support was offered  Q 7 minute safety checks were maintained

## 2022-06-06 VITALS
HEART RATE: 75 BPM | RESPIRATION RATE: 18 BRPM | OXYGEN SATURATION: 92 % | TEMPERATURE: 98.1 F | HEIGHT: 63 IN | BODY MASS INDEX: 27.32 KG/M2 | SYSTOLIC BLOOD PRESSURE: 140 MMHG | WEIGHT: 154.2 LBS | DIASTOLIC BLOOD PRESSURE: 81 MMHG

## 2022-06-06 PROBLEM — F29 PSYCHOSIS (HCC): Status: RESOLVED | Noted: 2022-05-27 | Resolved: 2022-06-06

## 2022-06-06 PROCEDURE — 99238 HOSP IP/OBS DSCHRG MGMT 30/<: CPT | Performed by: HOSPITALIST

## 2022-06-06 RX ADMIN — NICOTINE POLACRILEX 2 MG: 2 GUM, CHEWING BUCCAL at 09:27

## 2022-06-06 RX ADMIN — OLANZAPINE 10 MG: 10 TABLET, ORALLY DISINTEGRATING ORAL at 08:22

## 2022-06-06 RX ADMIN — ESCITALOPRAM OXALATE 10 MG: 10 TABLET ORAL at 08:22

## 2022-06-06 RX ADMIN — NICOTINE POLACRILEX 2 MG: 2 GUM, CHEWING BUCCAL at 07:04

## 2022-06-06 NOTE — NURSING NOTE
Patient visible on the unit  Per report patient was calm cooperative without any acute behaviors  Patient states that her mood is fine and feels better than the previous day  Patient became irritable after snack time when staff removed some of mayaans markers from the room without her knowledge according to patient  Patient stated " I can only handle one thing at a time , I hope that you don't get a family member who is placed here  That needs mental health because you will not be able to help them, the staff here in the day time are eating,y candy and rude to me  And blame me for my reactions"  Patient was able to calm down and redirect her feelings by reading  Patient denies anxiety and depression , patient denies SI/HI/AVH  Patient has medication changes  patient started ativan 0 5 bid  No other med changes  Patient overall had a good evening , cooperative with medications, social with select peers  Patient is pending discharge    Patient given candy per request of personal supply  Patient compliant with medications, patient request nicorette gum  Adequate fluid intake, consumed snack  Staff continues to support and monitor per Q 7 minute checks

## 2022-06-06 NOTE — PROGRESS NOTES
Patient D/C Belongings:     In room - shirt x3, pants x3, underwear x3, socks x3, binder x1, glasses x1    Storage - extra clothing, tote bag x1, fidget toys x2, blanket x1, rubix cube x1, goggles x1, pen x1, glasses x1, shoes x1, toiletries    Contraband - headphones x1, charging block x1, charging cord w/ 3 port split at end x1, ipad x1, charging cord x1, stylus x1, contact container x1

## 2022-06-06 NOTE — PROGRESS NOTES
Progress Note - Isael Garland 29 y o  adult MRN: [id number]    Unit/Bed#: [facility] 251-01 Encounter: [id number]        Subjective:   Patient seen and examined at bedside after reviewing the chart and discussing the case with the caring staff  Patient examined at bedside  Patient has no acute complaints  Patient is being discharged today  Patient is requesting all her prescriptions  I reviewed and reconciled patient's problem list and medications  Physical Exam   Vitals: Blood pressure 140/81, pulse 75, temperature 98 1 °F (36 7 °C), temperature source Temporal, resp  rate 18, height 5' 3" (1 6 m), weight 69 9 kg (154 lb 3 2 oz), last menstrual period 05/23/2022, SpO2 92 %  ,Body mass index is 27 32 kg/m²  Constitutional: Patient appears in no acute distress  HEENT: PERR, EOMI, MMM  Cardiovascular: Normal rate and regular rhythm  Pulmonary/Chest: Effort normal and breath sounds normal    Abdomen:  Nondistended  Assessment/Plan:  Zeno Severe is a(n) 29 y o  adult with psychosis, autism      MEDICAL CLEARANCE  Patient is medically cleared for discharge  All scripts will be sent out for the patient  1  Arthralgias/headache  Patient may take Tylenol as needed, Voltaren gel to use on as-needed basis  2  Dry eyes  Patient may use artificial tears as needed  3  Tobacco use  Nicorette gum as needed  4  Insomnia  Melatonin at bedtime, Trazodone as needed

## 2022-06-06 NOTE — DISCHARGE SUMMARY
Discharge Summary - [address] 29 y o  adult MRN: [id number]  Unit/Bed#: -01 Encounter: [id number]     Admission Date: 5/26/2022         Discharge Date: No discharge date for patient encounter  Attending Psychiatrist: Leslie Mcdonough MD    Reason for Admission/HPI: Major depressive disorder, single episode, unspecified [F32 9]  Psychosis ([address] ) [F29]    Patient is a 29 y o  adult presented to the ED on March 23rd for bizarre behavior and delusional thinking along with poor self-care  Patient was psychotic at home with paranoid ideation  She required a 302 involuntary commitment for admission to the inpatient psychiatric unit  Hospital Course: The patient was admitted to the inpatient psychiatric unit and started on every 7 minutes precautions  During the hospitalization the patient was attending individual therapy, group therapy, milieu therapy and occupational therapy  Patient was in the ED for over 48 hours awaiting a bed during which time she had been started on Zyprexa  Upon evaluation on the inpatient unit patient remained thought disordered, tangential somewhat illogical   However as she had initiated Zyprexa there was less bizarre delusional thinking such as feeling there was a nuclear war or overt paranoia that she expressed prior to hospitalization  Patient did report history of autism, mood instability as a teenager and often having difficulty with environmental stimuli  Patient was continued on Zyprexa which was increased eventually to 10 mg  Patient was also started on Lexapro due to depressed mood  Throughout her hospitalization patient was discharged focused  She denied any thoughts of self-harm or harm of others and through observation there was no signs or symptoms of danger to self or others    She however did have behavioral outbursts primarily in the setting of being upset about hospitalization, her behaviors appear to be those of acting-out and she showed remission from her behaviors when she was distracted or satisfied of her demands     On June 6 date of discharge patient was seen  She had a stable mood  She reported she was ready for discharge and looked forward to being able to go home  The patient would be living independently with the assistance of her support family  On this stay she had no behaviors of acting out or emotional outbursts  There was no evidence of psychosis or aliya  No evidence of suicidal or homicidal ideations  Patient was to have outpatient follow-up  Patient will be following up with the [address] [place] [address] on June 15th where she will have medication management until she gets an appointment with a psychiatrist   Patient aware that she needs to go through the [facility] assistance Office to obtain psychiatric services        Mental Status at time of Discharge:     Appearance:  age appropriate, casually dressed and Female transitioning to male   Behavior:  normal   Speech:  Average rate and volume   Mood:  normal   Affect:  blunted and constricted   Thought Process:  logical   Thought Content:  normal   Perceptual Disturbances: None   Risk Potential: Suicidal Ideations none and Homicidal Ideations none   Sensorium:  person, place and time/date   Cognition:  recent and remote memory grossly intact   Consciousness:  alert and awake    Attention: attention span and concentration were age appropriate   Insight:  fair   Judgment: fair   Gait/Station: normal gait/station   Motor Activity: no abnormal movements     Admission Diagnosis:Major depressive disorder, single episode, unspecified [F32 9]  Psychosis ([facility] ) [F29]    Discharge Diagnosis:   Principal Problem (Resolved):    Psychosis ([facility] )  Active Problems:    Autism        Lab results:  Admission on 05/26/2022   Component Date Value    Vit D, 25-Hydroxy 05/28/2022 49 0     WBC 05/28/2022 10 31 (A)    RBC 05/28/2022 4 87     Hemoglobin 05/28/2022 14 9     Hematocrit 05/28/2022 45 9     MCV 05/28/2022 94     MCH 05/28/2022 30 6     MCHC 05/28/2022 32 5     RDW 05/28/2022 12 7     MPV 05/28/2022 10 4     Platelets 46/43/4386 415 (A)    nRBC 05/28/2022 0     Neutrophils Relative 05/28/2022 62     Immat GRANS % 05/28/2022 1     Lymphocytes Relative 05/28/2022 25     Monocytes Relative 05/28/2022 9     Eosinophils Relative 05/28/2022 2     Basophils Relative 05/28/2022 1     Neutrophils Absolute 05/28/2022 6 44     Immature Grans Absolute 05/28/2022 0 05     Lymphocytes Absolute 05/28/2022 2 58     Monocytes Absolute 05/28/2022 0 96     Eosinophils Absolute 05/28/2022 0 19     Basophils Absolute 05/28/2022 0 09     Sodium 05/28/2022 139     Potassium 05/28/2022 4 0     Chloride 05/28/2022 101     CO2 05/28/2022 32     ANION GAP 05/28/2022 6     BUN 05/28/2022 15     Creatinine 05/28/2022 0 77     Glucose 05/28/2022 79     Glucose, Fasting 05/28/2022 79     Calcium 05/28/2022 9 8     AST 05/28/2022 15     ALT 05/28/2022 28     Alkaline Phosphatase 05/28/2022 64     Total Protein 05/28/2022 7 4     Albumin 05/28/2022 4 7     Total Bilirubin 05/28/2022 0 73     Folate 05/28/2022 18 0 (A)    Cholesterol 05/28/2022 136     Triglycerides 05/28/2022 92     HDL, Direct 05/28/2022 37     LDL Calculated 05/28/2022 81     Non-HDL-Chol (CHOL-HDL) 05/28/2022 99     TSH 3RD GENERATON 05/28/2022 1 244     Vitamin B-12 05/28/2022 512        Discharge Medications:  Current Discharge Medication List      START taking these medications    Details   melatonin 3 mg Take 1 tablet (3 mg total) by mouth daily at bedtime  Qty: 30 tablet, Refills: 1    Associated Diagnoses: Insomnia      OLANZapine (ZyPREXA) 10 mg tablet Take 1 tablet (10 mg total) by mouth daily at bedtime  Qty: 30 tablet, Refills: 1    Associated Diagnoses: Psychosis ([address] )            Current Discharge Medication List      STOP taking these medications       amphetamine-dextroamphetamine (ADDERALL) 20 mg tablet Comments:   Reason for Stopping:         amphetamine-dextroamphetamine (ADDERALL) 5 MG tablet Comments:   Reason for Stopping:              Current Discharge Medication List      CONTINUE these medications which have CHANGED    Details   escitalopram (LEXAPRO) 10 mg tablet Take 1 tablet (10 mg total) by mouth daily  Qty: 30 tablet, Refills: 1    Associated Diagnoses: Psychosis [facility])            Current Discharge Medication List           Discharge instructions/Information to patient and family:   See after visit summary for information provided to patient and family  Provisions for Follow-Up Care:  See after visit summary for information related to follow-up care and any pertinent home health orders  Discharge Statement   I spent 30 discharging the patient  This time was spent on the day of discharge  I had direct contact with the patient on the day of discharge  Additional documentation is required if more than 30 minutes were spent on discharge

## 2022-06-06 NOTE — BH TRANSITION RECORD
Contact Information: If you have any questions, concerns, pended studies, tests and/or procedures, or emergencies regarding your inpatient behavioral health visit  Please contact [facility]" [facility] behavioral health unit [phone] and ask to speak to a , nurse or physician  A contact is available 24 hours/ 7 days a week at this number  Summary of Procedures Performed During your Stay:  Below is a list of major procedures performed during your hospital stay and a summary of results:  - No major procedures performed  Pending Studies (From admission, onward)    None        If studies are pending at discharge, follow up with your PCP and/or referring provider

## 2022-06-06 NOTE — SOCIAL WORK
Sw spoke to Davi Frausto (mother, [phone]) confirming DC today 10-10:30am  Pt acknowledged DC plans  SW also confirmed pt did not have any medical concerns over weekend

## 2022-06-06 NOTE — PLAN OF CARE
Problem: PAIN - ADULT  Goal: Verbalizes/displays adequate comfort level or baseline comfort level  Description: Interventions:  - Encourage patient to monitor pain and request assistance  - Assess pain using appropriate pain scale  - Administer analgesics based on type and severity of pain and evaluate response  - Implement non-pharmacological measures as appropriate and evaluate response  - Consider cultural and social influences on pain and pain management  - Notify physician/advanced practitioner if interventions unsuccessful or patient reports new pain  Outcome: Completed     Problem: SAFETY ADULT  Goal: Patient will remain free of falls  Description: INTERVENTIONS:  - Educate patient/family on patient safety including physical limitations  - Instruct patient to call for assistance with activity   - Consult OT/PT to assist with strengthening/mobility   - Keep Call bell within reach  - Keep bed low and locked with side rails adjusted as appropriate  - Keep care items and personal belongings within reach  - Initiate and maintain comfort rounds  - Make Fall Risk Sign visible to staff  - Offer Toileting every  Hours, in advance of need  - Initiate/Maintain alarm  - Obtain necessary fall risk management equipment:   - Apply yellow socks and bracelet for high fall risk patients  - Consider moving patient to room near nurses station  Outcome: Completed  Goal: Maintain or return to baseline ADL function  Description: INTERVENTIONS:  -  Assess patient's ability to carry out ADLs; assess patient's baseline for ADL function and identify physical deficits which impact ability to perform ADLs (bathing, care of mouth/teeth, toileting, grooming, dressing, etc )  - Assess/evaluate cause of self-care deficits   - Assess range of motion  - Assess patient's mobility; develop plan if impaired  - Assess patient's need for assistive devices and provide as appropriate  - Encourage maximum independence but intervene and supervise when necessary  - Involve family in performance of ADLs  - Assess for home care needs following discharge   - Consider OT consult to assist with ADL evaluation and planning for discharge  - Provide patient education as appropriate  Outcome: Completed  Goal: Maintains/Returns to pre admission functional level  Description: INTERVENTIONS:  - Perform BMAT or MOVE assessment daily    - Set and communicate daily mobility goal to care team and patient/family/caregiver  - Collaborate with rehabilitation services on mobility goals if consulted  - Perform Range of Motion  times a day  - Reposition patient every  hours    - Dangle patient  times a day  - Stand patient  times a day  - Ambulate patient  times a day  - Out of bed to chair  times a day   - Out of bed for meals times a day  - Out of bed for toileting  - Record patient progress and toleration of activity level   Outcome: Completed     Problem: DISCHARGE PLANNING  Goal: Discharge to home or other facility with appropriate resources  Description: INTERVENTIONS:  - Identify barriers to discharge w/patient and caregiver  - Arrange for needed discharge resources and transportation as appropriate  - Identify discharge learning needs (meds, wound care, etc )  - Arrange for interpretive services to assist at discharge as needed  - Refer to Case Management Department for coordinating discharge planning if the patient needs post-hospital services based on physician/advanced practitioner order or complex needs related to functional status, cognitive ability, or social support system  Outcome: Completed     Problem: Alteration in Thoughts and Perception  Goal: Treatment Goal: Gain control of psychotic behaviors/thinking, reduce/eliminate presenting symptoms and demonstrate improved reality functioning upon discharge  Outcome: Completed  Goal: Verbalize thoughts and feelings  Description: Interventions:  - Promote a nonjudgmental and trusting relationship with the patient through active listening and therapeutic communication  - Assess patient's level of functioning, behavior and potential for risk  - Engage patient in 1 on 1 interactions  - Encourage patient to express fears, feelings, frustrations, and discuss symptoms    - [place] patient to reality, help patient recognize reality-based thinking   - Administer medications as ordered and assess for potential side effects  - Provide the patient education related to the signs and symptoms of the illness and desired effects of prescribed medications  Outcome: Completed  Goal: Refrain from acting on delusional thinking/internal stimuli  Description: Interventions:  - Monitor patient closely, per order   - Utilize least restrictive measures   - Set reasonable limits, give positive feedback for acceptable   - Administer medications as ordered and monitor of potential side effects  Outcome: Completed  Goal: Agree to be compliant with medication regime, as prescribed and report medication side effects  Description: Interventions:  - Offer appropriate PRN medication and supervise ingestion; conduct AIMS, as needed   Outcome: Completed  Goal: Attend and participate in unit activities, including therapeutic, recreational, and educational groups  Description: Interventions:  -Encourage Visitation and family involvement in care  Outcome: Completed  Goal: Recognize dysfunctional thoughts, communicate reality-based thoughts at the time of discharge  Description: Interventions:  - Provide medication and psycho-education to assist patient in compliance and developing insight into his/her illness   Outcome: Completed     Problem: Ineffective Coping  Goal: Identifies ineffective coping skills  Outcome: Completed  Goal: Identifies healthy coping skills  Outcome: Completed  Goal: Demonstrates healthy coping skills  Outcome: Completed  Goal: Participates in unit activities  Description: Interventions:  - Provide therapeutic environment   - Provide required programming   - Redirect inappropriate behaviors   Outcome: Completed  Goal: Patient/Family participate in treatment and DC plans  Description: Interventions:  - Provide therapeutic environment  Outcome: Completed  Goal: Patient/Family verbalizes awareness of resources  Outcome: Completed  Goal: Understands least restrictive measures  Description: Interventions:  - Utilize least restrictive behavior  Outcome: Completed  Goal: Free from restraint events  Description: - Utilize least restrictive measures   - Provide behavioral interventions   - Redirect inappropriate behaviors   Outcome: Completed     Problem: Depression  Goal: Treatment Goal: Demonstrate behavioral control of depressive symptoms, verbalize feelings of improved mood/affect, and adopt new coping skills prior to discharge  Outcome: Completed  Goal: Verbalize thoughts and feelings  Description: Interventions:  - Assess and re-assess patient's level of risk   - Engage patient in 1:1 interactions, daily, for a minimum of 15 minutes   - Encourage patient to express feelings, fears, frustrations, hopes   Outcome: Completed  Goal: Refrain from harming self  Description: Interventions:  - Monitor patient closely, per order   - Supervise medication ingestion, monitor effects and side effects   Outcome: Completed  Goal: Refrain from isolation  Description: Interventions:  - Develop a trusting relationship   - Encourage socialization   Outcome: Completed  Goal: Refrain from self-neglect  Outcome: Completed  Goal: Attend and participate in unit activities, including therapeutic, recreational, and educational groups  Description: Interventions:  - Provide therapeutic and educational activities daily, encourage attendance and participation, and document same in the medical record   Outcome: Completed  Goal: Complete daily ADLs, including personal hygiene independently, as able  Description: Interventions:  - Observe, teach, and assist patient with ADLS  - Monitor and promote a balance of rest/activity, with adequate nutrition and elimination   Outcome: Completed     Problem: Anxiety  Goal: Anxiety is at manageable level  Description: Interventions:  - Assess and monitor patient's anxiety level  - Monitor for signs and symptoms (heart palpitations, chest pain, shortness of breath, headaches, nausea, feeling jumpy, restlessness, irritable, apprehensive)  - Collaborate with interdisciplinary team and initiate plan and interventions as ordered    - [place] patient to unit/surroundings  - Explain treatment plan  - Encourage participation in care  - Encourage verbalization of concerns/fears  - Identify coping mechanisms  - Assist in developing anxiety-reducing skills  - Administer/offer alternative therapies  - Limit or eliminate stimulants  Outcome: Completed     Problem: Individualized Interventions  Goal: Patient will verbalize appropriate use of telephone within 5 days  Description: Interventions:  - Treatment team to determine use of supervised phone privileges   Outcome: Completed  Goal: Patient will verbalize need for hospitalization and will no longer attempt elopement within 5 days  Description: Interventions:  - Ongoing education to help patient understand need for hospitalization  Outcome: Completed  Goal: Patient will recognize inappropriate behaviors and develop alternative behaviors within 5 days  Description: Interventions:  - Patient in collaboration with Treatment Team will develop a behavior management plan to help identify effective coping skills to deal with stressors  Outcome: Completed     Problem: DISCHARGE PLANNING - CARE MANAGEMENT  Goal: Discharge to post-acute care or home with appropriate resources  Description: INTERVENTIONS:  - Conduct assessment to determine patient/family and health care team treatment goals, and need for post-acute services based on payer coverage, community resources, and patient preferences, and barriers to discharge  - Address psychosocial, clinical, and financial barriers to discharge as identified in assessment in conjunction with the patient/family and health care team  - Arrange appropriate level of post-acute services according to patients   needs and preference and payer coverage in collaboration with the physician and health care team  - Communicate with and update the patient/family, physician, and health care team regarding progress on the discharge plan  - Arrange appropriate transportation to post-acute venues  Outcome: Completed

## 2022-06-06 NOTE — NURSING NOTE
Patient excited for discharge  Left unit walking with staff with all belongings  Patient understood AVS and was reviewed with patient  Patient was pleasant and cooperative

## 2022-06-06 NOTE — NURSING NOTE
Patient observed sleeping through the majority of the night per rounds with two periods of broken sleep   No distress noted with even respirations
